# Patient Record
Sex: FEMALE | Race: WHITE | NOT HISPANIC OR LATINO | Employment: FULL TIME | URBAN - METROPOLITAN AREA
[De-identification: names, ages, dates, MRNs, and addresses within clinical notes are randomized per-mention and may not be internally consistent; named-entity substitution may affect disease eponyms.]

---

## 2022-02-17 LAB
C TRACH RRNA SPEC QL PROBE: NEGATIVE
HBV SURFACE AG SERPL QL IA: NEGATIVE
HIV 1+2 AB+HIV1 P24 AG SERPL QL IA: NEGATIVE
N GONORRHOEAE, AMPLIFIED DNA: NEGATIVE
RPR: NONREACTIVE
RUBELLA IMMUNE STATUS: NORMAL

## 2022-05-02 ENCOUNTER — HOSPITAL ENCOUNTER (EMERGENCY)
Facility: HOSPITAL | Age: 32
Discharge: HOME | End: 2022-05-02
Payer: COMMERCIAL

## 2022-05-02 VITALS
WEIGHT: 126 LBS | HEART RATE: 114 BPM | RESPIRATION RATE: 20 BRPM | SYSTOLIC BLOOD PRESSURE: 124 MMHG | OXYGEN SATURATION: 99 % | BODY MASS INDEX: 22.32 KG/M2 | DIASTOLIC BLOOD PRESSURE: 78 MMHG | HEIGHT: 63 IN

## 2022-05-02 DIAGNOSIS — N39.0 UTI (URINARY TRACT INFECTION): ICD-10-CM

## 2022-05-02 LAB
APPEARANCE UR: ABNORMAL
BACTERIA #/AREA URNS AUTO: ABNORMAL /HPF
BILIRUB UR QL STRIP.AUTO: NEGATIVE MG/DL
COLOR UR AUTO: YELLOW
GLUCOSE UR QL STRIP.AUTO: NEGATIVE MG/DL
KETONES UR QL STRIP.AUTO: ABNORMAL MG/DL
LEUKOCYTE ESTERASE UR QL STRIP.AUTO: ABNORMAL UNIT/L
NITRITE UR QL STRIP.AUTO: NEGATIVE
PH UR STRIP.AUTO: 6 [PH]
PROT UR QL STRIP.AUTO: ABNORMAL MG/DL
RBC #/AREA URNS AUTO: ABNORMAL /HPF
RBC UR QL AUTO: ABNORMAL UNIT/L
SP GR UR STRIP.AUTO: 1.02 (ref 1–1.03)
SQUAMOUS #/AREA URNS AUTO: ABNORMAL /LPF
UROBILINOGEN UR STRIP-ACNC: 1 MG/DL
WBC #/AREA URNS AUTO: >=100 /HPF

## 2022-05-02 PROCEDURE — 96372 THER/PROPH/DIAG INJ SC/IM: CPT | Performed by: SPECIALIST

## 2022-05-02 PROCEDURE — 99284 EMERGENCY DEPT VISIT MOD MDM: CPT | Mod: 25

## 2022-05-02 PROCEDURE — 96365 THER/PROPH/DIAG IV INF INIT: CPT

## 2022-05-02 PROCEDURE — 63600175 PHARM REV CODE 636 W HCPCS: Performed by: SPECIALIST

## 2022-05-02 PROCEDURE — 81015 MICROSCOPIC EXAM OF URINE: CPT | Performed by: SPECIALIST

## 2022-05-02 PROCEDURE — 81003 URINALYSIS AUTO W/O SCOPE: CPT | Performed by: SPECIALIST

## 2022-05-02 PROCEDURE — 96361 HYDRATE IV INFUSION ADD-ON: CPT

## 2022-05-02 PROCEDURE — 87077 CULTURE AEROBIC IDENTIFY: CPT | Performed by: SPECIALIST

## 2022-05-02 RX ORDER — TERBUTALINE SULFATE 1 MG/ML
0.25 INJECTION SUBCUTANEOUS ONCE
Status: COMPLETED | OUTPATIENT
Start: 2022-05-02 | End: 2022-05-02

## 2022-05-02 RX ORDER — CEFTRIAXONE 2 G/50ML
2 INJECTION, SOLUTION INTRAVENOUS
Status: COMPLETED | OUTPATIENT
Start: 2022-05-02 | End: 2022-05-02

## 2022-05-02 RX ADMIN — SODIUM CHLORIDE, POTASSIUM CHLORIDE, SODIUM LACTATE AND CALCIUM CHLORIDE 1000 ML: 600; 310; 30; 20 INJECTION, SOLUTION INTRAVENOUS at 12:05

## 2022-05-02 RX ADMIN — TERBUTALINE SULFATE 0.25 MG: 1 INJECTION SUBCUTANEOUS at 02:05

## 2022-05-02 RX ADMIN — CEFTRIAXONE 2 G: 2 INJECTION, SOLUTION INTRAVENOUS at 01:05

## 2022-05-02 NOTE — ED NOTES
Dr pham notified pt here c/o rt lower back pain . Maternal tachycardia . Bp wnl. G1 29 wks 5 days. Md states order UA .

## 2022-05-02 NOTE — ED PROVIDER NOTES
"       TRAN NOTE     Admit Date: 2022  TRAN Physician: Derrell Ag  Primary OBGYN: Dr. Marcin Jaimes    Admit Diagnosis/Chief Complaint: Back Pain  Discharge Diagnosis: Pregnancy 29 weeks.  Urinary tract infection possible early pyelonephritis. and Lower back pain of pregnancy.    Chief Complaint   Patient presents with    Back Pain     Right sides lower started last night     Hospital Course:  Melissa Lopez is a 31 y.o.  at 29w5d presents complaining of right-sided lower back and flank pain which initiated approximately 12:30 a.m..  Patient denies any fever or dysuria.  She reports the pain is variable in nature.  This IUP is complicated by no problems reported by the patient.  All.    Patient denies contractions,, leakage of fluid,, fever,, nausea/vomiting, and dysuria  Fetal Movement: normal.    /78 (BP Location: Left arm, Patient Position: Lying)   Pulse (!) 114   Resp 20   Ht 5' 3" (1.6 m)   Wt 57.2 kg (126 lb)   SpO2 99%   Breastfeeding Yes   BMI 22.32 kg/m²   Pulse:  [114] 114  Resp:  [20] 20  SpO2:  [99 %] 99 %  BP: (124)/(78) 124/78    General: in no apparent distress  Cardiovascular: regular rate and rhythm no murmurs  Respiratory: clear to auscultation, no wheezes, rales or rhonchi, symmetric air entry  Abdominal: fundus soft, nontender 30 weeks size FHT present  Back: lumbar tenderness present CVA tenderness none  Extremeties no redness or tenderness in the calves or thighs no edema    SSE:   SVE:  Cervix is closed, thick, posterior.  No evidence of ruptured membranes.    FHT:  Fetal heart tones are present.  TOCO:  No contractions are noted.    Medical Decision Making:  Patient will be continued observation.  Urinalysis is ordered.    LABS:     Recent Results (from the past 24 hour(s))   Urinalysis    Collection Time: 22 10:45 AM   Result Value Ref Range    Color, UA Yellow Yellow, Colorless, Other, Clear    Appearance, UA Cloudy (A) Clear    Specific Gravity, UA 1.025 1.001 " - 1.030    pH, UA 6.0 5.0, 5.5, 6.0, 6.5, 7.0, 7.5, 8.0, 8.5    Protein, UA 1+ (A) Negative mg/dL    Glucose, UA Negative Negative, Normal mg/dL    Ketones, UA 1+ (A) Negative, +1, +2, +3, +4, +5 mg/dL    Blood, UA Trace-Intact (A) Negative unit/L    Bilirubin, UA Negative Negative mg/dL    Urobilinogen, UA 1.0 0.2, 1.0 mg/dL    Nitrites, UA Negative Negative    Leukocyte Esterase, UA 2+ (A) Negative, 75  unit/L   Urinalysis, Microscopic    Collection Time: 22 10:45 AM   Result Value Ref Range    RBC, UA None Seen None Seen, 0-2, 3-5, 0-5 /HPF    WBC, UA >=100 (A) None Seen, 0-2, 3-5, 0-5 /HPF    Squamous Epithelial Cells, UA 5-10 (A) 0-4, None Seen /LPF    Bacteria, UA Many (A) None Seen, Rare, Occasional /HPF     [unfilled]     Imaging Results    None          ASSESMENT: Melissa Lopez is a 31 y.o.   at 29w5d with lower back pain and evidence of urinary tract infection.  Possible pyelonephritis.  Patient will be treated with Rocephin 2 g IV and referred to her private OB for follow-up.  Chief Complaint   Patient presents with    Back Pain     Right sides lower started last night   Patient began having some regular uterine contractions.  She reports them as being very mild.  She will be treated with Brethine and be evaluated for its effect.  Uterine contractions resolved with treatment of Brethine.  Patient will be discharged home with follow-up this week with her primary OB.  Discharge Diagnosis:  Pregnancy 29 weeks.  Urinary tract infection.  Early pyelonephritis.  Uterine inertia.    Status:Stable    Disposition:  discharged to home    Medications:   Patient to contact her primary Ob if continue with outpatient antibiotics are required.    Patient Instructions:   Current Discharge Medication List      CONTINUE these medications which have NOT CHANGED    Details   prenatal vit/iron fum/folic ac (PRENATAL 1+1 ORAL) Take by mouth.             - Pt was given routine pregnancy instructions including to  return to triage if she had any vaginal bleeding (other than spotting for the next 48hrs), any loss of fluid like her water broke, decreased fetal movement, or contractions Q 5min lasting for 2 or more hours.  Patient is to notify her primary Ob of elevated temperature greater than 100° F. she is to return to the OB Emergency Department if she has any further complications.  Pt was also instructed to drink copious water. Patient voiced understanding of all these instructions and was subsequently discharged home.    She will follow up with Network[facility] reference to record PCT     No discharge procedures on file.    Derrell Ag MD  OB-GYN Hospitalist       Derrell Ag MD  05/02/22 4026

## 2022-05-04 LAB — BACTERIA UR CULT: ABNORMAL

## 2022-05-31 ENCOUNTER — HOSPITAL ENCOUNTER (INPATIENT)
Facility: HOSPITAL | Age: 32
LOS: 4 days | Discharge: HOME | End: 2022-06-04
Attending: SPECIALIST | Admitting: OBSTETRICS & GYNECOLOGY
Payer: COMMERCIAL

## 2022-05-31 DIAGNOSIS — N39.0 UTI (URINARY TRACT INFECTION): Primary | ICD-10-CM

## 2022-05-31 DIAGNOSIS — Z3A.34 34 WEEKS GESTATION OF PREGNANCY: ICD-10-CM

## 2022-05-31 DIAGNOSIS — O41.00X0 OLIGOHYDRAMNIOS: ICD-10-CM

## 2022-05-31 PROBLEM — Z3A.33 33 WEEKS GESTATION OF PREGNANCY: Status: ACTIVE | Noted: 2022-05-31

## 2022-05-31 LAB
ALBUMIN SERPL-MCNC: 2.9 GM/DL (ref 3.5–5)
ALBUMIN/GLOB SERPL: 0.9 RATIO (ref 1.1–2)
ALP SERPL-CCNC: 174 UNIT/L (ref 40–150)
ALT SERPL-CCNC: 8 UNIT/L (ref 0–55)
APPEARANCE UR: ABNORMAL
AST SERPL-CCNC: 14 UNIT/L (ref 5–34)
BACTERIA #/AREA URNS AUTO: ABNORMAL /HPF
BASOPHILS # BLD AUTO: 0.03 X10(3)/MCL (ref 0–0.2)
BASOPHILS NFR BLD AUTO: 0.2 %
BILIRUB UR QL STRIP.AUTO: NEGATIVE MG/DL
BILIRUBIN DIRECT+TOT PNL SERPL-MCNC: 1 MG/DL
BUN SERPL-MCNC: 6.7 MG/DL (ref 7–18.7)
CALCIUM SERPL-MCNC: 8.5 MG/DL (ref 8.4–10.2)
CHLORIDE SERPL-SCNC: 104 MMOL/L (ref 98–107)
CO2 SERPL-SCNC: 18 MMOL/L (ref 22–29)
COLOR UR AUTO: YELLOW
CREAT SERPL-MCNC: 0.7 MG/DL (ref 0.55–1.02)
EOSINOPHIL # BLD AUTO: 0 X10(3)/MCL (ref 0–0.9)
EOSINOPHIL NFR BLD AUTO: 0 %
ERYTHROCYTE [DISTWIDTH] IN BLOOD BY AUTOMATED COUNT: 12.5 % (ref 11.5–17)
GLOBULIN SER-MCNC: 3.1 GM/DL (ref 2.4–3.5)
GLUCOSE SERPL-MCNC: 81 MG/DL (ref 74–100)
GLUCOSE UR QL STRIP.AUTO: NEGATIVE MG/DL
HCT VFR BLD AUTO: 33.5 % (ref 37–47)
HGB BLD-MCNC: 11.1 GM/DL (ref 12–16)
IMM GRANULOCYTES # BLD AUTO: 0.2 X10(3)/MCL (ref 0–0.02)
IMM GRANULOCYTES NFR BLD AUTO: 1.5 % (ref 0–0.43)
KETONES UR QL STRIP.AUTO: ABNORMAL MG/DL
LEUKOCYTE ESTERASE UR QL STRIP.AUTO: ABNORMAL UNIT/L
LYMPHOCYTES # BLD AUTO: 0.64 X10(3)/MCL (ref 0.6–4.6)
LYMPHOCYTES NFR BLD AUTO: 4.8 %
MCH RBC QN AUTO: 29.1 PG (ref 27–31)
MCHC RBC AUTO-ENTMCNC: 33.1 MG/DL (ref 33–36)
MCV RBC AUTO: 87.7 FL (ref 80–94)
MONOCYTES # BLD AUTO: 0.39 X10(3)/MCL (ref 0.1–1.3)
MONOCYTES NFR BLD AUTO: 2.9 %
NEUTROPHILS # BLD AUTO: 12.2 X10(3)/MCL (ref 2.1–9.2)
NEUTROPHILS NFR BLD AUTO: 90.6 %
NITRITE UR QL STRIP.AUTO: POSITIVE
NRBC BLD AUTO-RTO: 0 %
PH UR STRIP.AUTO: 6 [PH]
PLATELET # BLD AUTO: 182 X10(3)/MCL (ref 130–400)
PMV BLD AUTO: 10.5 FL (ref 9.4–12.4)
POTASSIUM SERPL-SCNC: 3.7 MMOL/L (ref 3.5–5.1)
PRENATAL STREP B CULTURE: NORMAL
PROT SERPL-MCNC: 6 GM/DL (ref 6.4–8.3)
PROT UR QL STRIP.AUTO: ABNORMAL MG/DL
RBC # BLD AUTO: 3.82 X10(6)/MCL (ref 4.2–5.4)
RBC #/AREA URNS AUTO: 12 /HPF
RBC UR QL AUTO: ABNORMAL UNIT/L
SODIUM SERPL-SCNC: 136 MMOL/L (ref 136–145)
SP GR UR STRIP.AUTO: 1.01 (ref 1–1.03)
SQUAMOUS #/AREA URNS AUTO: <4 /LPF
UROBILINOGEN UR STRIP-ACNC: 1 MG/DL
WBC # SPEC AUTO: 13.5 X10(3)/MCL (ref 4.5–11.5)
WBC #/AREA URNS AUTO: >100 /HPF

## 2022-05-31 PROCEDURE — 99285 EMERGENCY DEPT VISIT HI MDM: CPT | Mod: 25

## 2022-05-31 PROCEDURE — 63600175 PHARM REV CODE 636 W HCPCS: Performed by: OBSTETRICS & GYNECOLOGY

## 2022-05-31 PROCEDURE — 25000003 PHARM REV CODE 250: Performed by: OBSTETRICS & GYNECOLOGY

## 2022-05-31 PROCEDURE — 81001 URINALYSIS AUTO W/SCOPE: CPT | Performed by: OBSTETRICS & GYNECOLOGY

## 2022-05-31 PROCEDURE — 96360 HYDRATION IV INFUSION INIT: CPT

## 2022-05-31 PROCEDURE — 96372 THER/PROPH/DIAG INJ SC/IM: CPT | Performed by: OBSTETRICS & GYNECOLOGY

## 2022-05-31 PROCEDURE — 94761 N-INVAS EAR/PLS OXIMETRY MLT: CPT

## 2022-05-31 PROCEDURE — 11000001 HC ACUTE MED/SURG PRIVATE ROOM

## 2022-05-31 PROCEDURE — 72100002 HC LABOR CARE, 1ST 8 HOURS

## 2022-05-31 PROCEDURE — 85025 COMPLETE CBC W/AUTO DIFF WBC: CPT | Performed by: OBSTETRICS & GYNECOLOGY

## 2022-05-31 PROCEDURE — 36415 COLL VENOUS BLD VENIPUNCTURE: CPT | Performed by: OBSTETRICS & GYNECOLOGY

## 2022-05-31 PROCEDURE — 86780 TREPONEMA PALLIDUM: CPT | Performed by: OBSTETRICS & GYNECOLOGY

## 2022-05-31 PROCEDURE — 80053 COMPREHEN METABOLIC PANEL: CPT | Performed by: OBSTETRICS & GYNECOLOGY

## 2022-05-31 PROCEDURE — 87653 STREP B DNA AMP PROBE: CPT | Performed by: OBSTETRICS & GYNECOLOGY

## 2022-05-31 RX ORDER — CIPROFLOXACIN 500 MG/1
500 TABLET ORAL 2 TIMES DAILY
Status: DISCONTINUED | OUTPATIENT
Start: 2022-05-31 | End: 2022-06-04 | Stop reason: HOSPADM

## 2022-05-31 RX ORDER — BETAMETHASONE SODIUM PHOSPHATE AND BETAMETHASONE ACETATE 3; 3 MG/ML; MG/ML
12 INJECTION, SUSPENSION INTRA-ARTICULAR; INTRALESIONAL; INTRAMUSCULAR; SOFT TISSUE ONCE
Status: DISCONTINUED | OUTPATIENT
Start: 2022-06-01 | End: 2022-06-01

## 2022-05-31 RX ORDER — SODIUM CHLORIDE, SODIUM LACTATE, POTASSIUM CHLORIDE, CALCIUM CHLORIDE 600; 310; 30; 20 MG/100ML; MG/100ML; MG/100ML; MG/100ML
INJECTION, SOLUTION INTRAVENOUS CONTINUOUS
Status: DISCONTINUED | OUTPATIENT
Start: 2022-05-31 | End: 2022-06-01

## 2022-05-31 RX ORDER — BETAMETHASONE SODIUM PHOSPHATE AND BETAMETHASONE ACETATE 3; 3 MG/ML; MG/ML
12 INJECTION, SUSPENSION INTRA-ARTICULAR; INTRALESIONAL; INTRAMUSCULAR; SOFT TISSUE ONCE
Status: COMPLETED | OUTPATIENT
Start: 2022-05-31 | End: 2022-05-31

## 2022-05-31 RX ORDER — NALBUPHINE HYDROCHLORIDE 10 MG/ML
10 INJECTION, SOLUTION INTRAMUSCULAR; INTRAVENOUS; SUBCUTANEOUS
Status: DISCONTINUED | OUTPATIENT
Start: 2022-05-31 | End: 2022-06-04 | Stop reason: HOSPADM

## 2022-05-31 RX ADMIN — SODIUM CHLORIDE, POTASSIUM CHLORIDE, SODIUM LACTATE AND CALCIUM CHLORIDE: 600; 310; 30; 20 INJECTION, SOLUTION INTRAVENOUS at 07:05

## 2022-05-31 RX ADMIN — CIPROFLOXACIN 500 MG: 500 TABLET, FILM COATED ORAL at 09:05

## 2022-05-31 RX ADMIN — BETAMETHASONE ACETATE AND BETAMETHASONE SODIUM PHOSPHATE 12 MG: 3; 3 INJECTION, SUSPENSION INTRA-ARTICULAR; INTRALESIONAL; INTRAMUSCULAR; SOFT TISSUE at 12:05

## 2022-05-31 RX ADMIN — SODIUM CHLORIDE, POTASSIUM CHLORIDE, SODIUM LACTATE AND CALCIUM CHLORIDE: 600; 310; 30; 20 INJECTION, SOLUTION INTRAVENOUS at 09:05

## 2022-05-31 RX ADMIN — SODIUM CHLORIDE, POTASSIUM CHLORIDE, SODIUM LACTATE AND CALCIUM CHLORIDE: 600; 310; 30; 20 INJECTION, SOLUTION INTRAVENOUS at 11:05

## 2022-05-31 RX ADMIN — CIPROFLOXACIN 500 MG: 500 TABLET, FILM COATED ORAL at 08:05

## 2022-05-31 RX ADMIN — SODIUM CHLORIDE, POTASSIUM CHLORIDE, SODIUM LACTATE AND CALCIUM CHLORIDE: 600; 310; 30; 20 INJECTION, SOLUTION INTRAVENOUS at 08:05

## 2022-05-31 RX ADMIN — SODIUM CHLORIDE, POTASSIUM CHLORIDE, SODIUM LACTATE AND CALCIUM CHLORIDE 1000 ML: 600; 310; 30; 20 INJECTION, SOLUTION INTRAVENOUS at 06:05

## 2022-05-31 RX ADMIN — NALBUPHINE HYDROCHLORIDE 10 MG: 10 INJECTION, SOLUTION INTRAMUSCULAR; INTRAVENOUS; SUBCUTANEOUS at 09:05

## 2022-05-31 RX ADMIN — SODIUM CHLORIDE, POTASSIUM CHLORIDE, SODIUM LACTATE AND CALCIUM CHLORIDE: 600; 310; 30; 20 INJECTION, SOLUTION INTRAVENOUS at 04:05

## 2022-05-31 NOTE — H&P
[facility] - Labor and Delivery  Obstetrics  History & Physical    Patient Name: Melissa Lopez  MRN: [id number]  Admission Date: 2022  Primary Care Provider: No primary care provider on file.    Subjective:     Principal Problem:<principal problem not specified>    History of Present Illness: 33 weeks with UTI,PTL,and oligohydramnios    Obstetric HPI:      This pregnancy has been complicated by     OB History    Para Term  AB Living   1 0 0 0 0 0   SAB IAB Ectopic Multiple Live Births   0 0 0 0 0      # Outcome Date GA Lbr Spenser/2nd Weight Sex Delivery Anes PTL Lv   1 Current              Past Medical History:   Diagnosis Date    Thyroid disease      Past Surgical History:   Procedure Laterality Date    TONSILLECTOMY         PTA Medications   Medication Sig    prenatal vit/iron fum/folic ac (PRENATAL 1+1 ORAL) Take by mouth.       Review of patient's allergies indicates:   Allergen Reactions    Sulfa (sulfonamide antibiotics) Anaphylaxis        Family History     Problem Relation (Age of Onset)    Breast cancer Mother        Tobacco Use    Smoking status: Never Smoker    Smokeless tobacco: Never Used   Substance and Sexual Activity    Alcohol use: Not Currently    Drug use: Not Currently    Sexual activity: Not on file     Review of Systems   Objective:     Vital Signs (Most Recent):  Temp: 97.9 °F (36.6 °C) (22 1245)  Pulse: 104 (22 0759)  Resp: 17 (22 1240)  BP: 122/76 (22 1240) Vital Signs (24h Range):  Temp:  [97.9 °F (36.6 °C)-98.2 °F (36.8 °C)] 97.9 °F (36.6 °C)  Pulse:  [101-116] 104  Resp:  [17-18] 17  BP: (107-122)/(66-82) 122/76        There is no height or weight on file to calculate BMI.          Physical Exam    Cervix:  Dilation:  0  Effacement:  25%         Significant Labs:          Assessment/Plan:     31 y.o. female  at 33w6d for: PTL, UTI    There are no hospital problems to display for this patient.    As above    Marcin Jaimes,  MD  Obstetrics  [facility] - Labor and Delivery

## 2022-05-31 NOTE — Clinical Note
Transfer To (Destination): [facility] ANTEPARTUM [[id number]]   Diagnosis: UTI (urinary tract infection) [949449]   Future Attending Provider: LUIS ALFREDO ATKINS [[id number]]   Requested Bed Type: Standard [1]   Admitting Provider: LUIS ALFREDO ATKINS [[id number]]

## 2022-06-01 ENCOUNTER — ANESTHESIA EVENT (OUTPATIENT)
Dept: OBSTETRICS AND GYNECOLOGY | Facility: HOSPITAL | Age: 32
End: 2022-06-01
Payer: COMMERCIAL

## 2022-06-01 LAB
ABORH RETYPE: NORMAL
GROUP & RH: NORMAL
INDIRECT COOMBS GEL: NORMAL
STREP B PCR (OHS): NOT DETECTED
T PALLIDUM AB SER QL: NONREACTIVE
T PALLIDUM AB SER QL: NORMAL

## 2022-06-01 PROCEDURE — 63600175 PHARM REV CODE 636 W HCPCS: Performed by: OBSTETRICS & GYNECOLOGY

## 2022-06-01 PROCEDURE — 86850 RBC ANTIBODY SCREEN: CPT | Performed by: OBSTETRICS & GYNECOLOGY

## 2022-06-01 PROCEDURE — 25000003 PHARM REV CODE 250: Performed by: OBSTETRICS & GYNECOLOGY

## 2022-06-01 PROCEDURE — 11000001 HC ACUTE MED/SURG PRIVATE ROOM

## 2022-06-01 PROCEDURE — 36415 COLL VENOUS BLD VENIPUNCTURE: CPT | Performed by: OBSTETRICS & GYNECOLOGY

## 2022-06-01 PROCEDURE — 72100003 HC LABOR CARE, EA. ADDL. 8 HRS

## 2022-06-01 RX ORDER — SODIUM CHLORIDE, SODIUM LACTATE, POTASSIUM CHLORIDE, CALCIUM CHLORIDE 600; 310; 30; 20 MG/100ML; MG/100ML; MG/100ML; MG/100ML
INJECTION, SOLUTION INTRAVENOUS CONTINUOUS
Status: DISCONTINUED | OUTPATIENT
Start: 2022-06-01 | End: 2022-06-02

## 2022-06-01 RX ORDER — SODIUM CITRATE AND CITRIC ACID MONOHYDRATE 334; 500 MG/5ML; MG/5ML
SOLUTION ORAL
Status: DISPENSED
Start: 2022-06-01 | End: 2022-06-02

## 2022-06-01 RX ORDER — SODIUM CITRATE AND CITRIC ACID MONOHYDRATE 334; 500 MG/5ML; MG/5ML
30 SOLUTION ORAL
Status: DISCONTINUED | OUTPATIENT
Start: 2022-06-01 | End: 2022-06-02

## 2022-06-01 RX ORDER — CARBOPROST TROMETHAMINE 250 UG/ML
250 INJECTION, SOLUTION INTRAMUSCULAR
Status: DISCONTINUED | OUTPATIENT
Start: 2022-06-01 | End: 2022-06-04 | Stop reason: HOSPADM

## 2022-06-01 RX ORDER — MISOPROSTOL 100 UG/1
800 TABLET ORAL
Status: DISCONTINUED | OUTPATIENT
Start: 2022-06-01 | End: 2022-06-02

## 2022-06-01 RX ORDER — METHYLERGONOVINE MALEATE 0.2 MG/ML
200 INJECTION INTRAVENOUS
Status: DISCONTINUED | OUTPATIENT
Start: 2022-06-01 | End: 2022-06-02

## 2022-06-01 RX ORDER — OXYTOCIN/RINGER'S LACTATE 30/500 ML
334 PLASTIC BAG, INJECTION (ML) INTRAVENOUS ONCE
Status: DISCONTINUED | OUTPATIENT
Start: 2022-06-01 | End: 2022-06-02

## 2022-06-01 RX ORDER — FAMOTIDINE 10 MG/ML
20 INJECTION INTRAVENOUS
Status: DISCONTINUED | OUTPATIENT
Start: 2022-06-01 | End: 2022-06-02

## 2022-06-01 RX ORDER — BETAMETHASONE SODIUM PHOSPHATE AND BETAMETHASONE ACETATE 3; 3 MG/ML; MG/ML
12 INJECTION, SUSPENSION INTRA-ARTICULAR; INTRALESIONAL; INTRAMUSCULAR; SOFT TISSUE ONCE
Status: COMPLETED | OUTPATIENT
Start: 2022-06-01 | End: 2022-06-01

## 2022-06-01 RX ORDER — OXYTOCIN/RINGER'S LACTATE 30/500 ML
95 PLASTIC BAG, INJECTION (ML) INTRAVENOUS ONCE
Status: DISCONTINUED | OUTPATIENT
Start: 2022-06-01 | End: 2022-06-02

## 2022-06-01 RX ADMIN — SODIUM CHLORIDE, POTASSIUM CHLORIDE, SODIUM LACTATE AND CALCIUM CHLORIDE: 600; 310; 30; 20 INJECTION, SOLUTION INTRAVENOUS at 06:06

## 2022-06-01 RX ADMIN — SODIUM CHLORIDE, POTASSIUM CHLORIDE, SODIUM LACTATE AND CALCIUM CHLORIDE: 600; 310; 30; 20 INJECTION, SOLUTION INTRAVENOUS at 03:06

## 2022-06-01 RX ADMIN — BETAMETHASONE ACETATE AND BETAMETHASONE SODIUM PHOSPHATE 12 MG: 3; 3 INJECTION, SUSPENSION INTRA-ARTICULAR; INTRALESIONAL; INTRAMUSCULAR; SOFT TISSUE at 08:06

## 2022-06-01 RX ADMIN — CIPROFLOXACIN 500 MG: 500 TABLET, FILM COATED ORAL at 08:06

## 2022-06-01 RX ADMIN — CIPROFLOXACIN 500 MG: 500 TABLET, FILM COATED ORAL at 09:06

## 2022-06-01 NOTE — PROGRESS NOTES
[facility] - Labor and Delivery  Obstetrics  Antepartum Progress Note    Patient Name: Melissa Lopez  MRN: [id number]  Admission Date: 2022  Hospital Length of Stay: 1 days  Attending Physician: Marcin Jaimes MD  Primary Care Provider: No primary care provider on file.    Subjective:     Principal Problem:    HPI: 34 weeks with oligo, and breech presentation  No notes on file    Hospital Course:  Reactive tracing with very spaced out decels  No new subjective & objective note has been filed under this hospital service since the last note was generated.    Assessment/Plan:     31 y.o. female  at 34w0d for:    No notes have been filed under this hospital service.  Service: Obstetrics and Gynecology    Will proceed with section tomorrow unless situation changes  Marcin Jaimes MD  Obstetrics  [facility] - Labor and Delivery

## 2022-06-01 NOTE — CONSULTS
Melissa Lopez is a 31 y.o.  female  at 34w0d  gestation who presented yesterday with contractions.  BPP done and noted to have oligo at 3.5cm. Initial SVE showed 1cm dilation.. She received Celestone x 1 on 22. She had UA done, and is being treated for UTI with Cipro as UA showed 3+ protein, 2+ blood, positive nitrites and 3+ leukocyte esterase. Noted on 22 had positive urine culture for E Coli and she reports treated with Rocephin IV x 1 dose. Reports occasional contractions. Denies vaginal bleeding, leaking of fluid, or decreased fetal movement. Denies headaches, visual disturbances, epigastric pain.      Review of Systems   Constitutional: Negative for fever.   HENT: Negative for ear pain, facial swelling and sore throat.    Eyes: Negative for visual disturbance.   Cardiovascular: Negative.    Gastrointestinal: Negative for abdominal pain, nausea and vomiting.   Endocrine: Negative.    Genitourinary: Negative for flank pain, vaginal bleeding and vaginal discharge.   Neurological: Negative for headaches.   Psychiatric/Behavioral: Negative.      [unfilled]  the patient    Past Medical History:   Diagnosis Date    Thyroid disease       Past Surgical History:   Procedure Laterality Date    TONSILLECTOMY        Social History     Socioeconomic History    Marital status:    Tobacco Use    Smoking status: Never Smoker    Smokeless tobacco: Never Used   Substance and Sexual Activity    Alcohol use: Not Currently    Drug use: Not Currently      Family History   Problem Relation Age of Onset    Breast cancer Mother       Review of patient's allergies indicates:   Allergen Reactions    Sulfa (sulfonamide antibiotics) Anaphylaxis      Prior to Admission medications    Medication Sig Start Date End Date Taking? Authorizing Provider   prenatal vit/iron fum/folic ac (PRENATAL 1+1 ORAL) Take by mouth.    Historical Provider            Vitals:    22 1919 22 2133 22 0100 22  0501   BP: 109/67 103/71 113/61 107/60   Pulse: 100 92 94 108   Resp: 18 18 18 18   Temp:    98.1 °F (36.7 °C)   TempSrc:    Oral    No LMP recorded. Patient is pregnant.   HT:    WT:    BMI:       Temp:  [97.9 °F (36.6 °C)-98.1 °F (36.7 °C)] 98.1 °F (36.7 °C)  Pulse:  [] 108  Resp:  [17-18] 18  BP: (103-122)/(60-76) 107/60  Physical Exam  Constitutional:       Appearance: Normal appearance.   Cardiovascular:      Rate and Rhythm: Normal rate.      Heart sounds: Normal heart sounds.   Pulmonary:      Effort: Pulmonary effort is normal.      Breath sounds: Normal breath sounds.   Abdominal:      Palpations: Abdomen is soft.   Musculoskeletal:         General: Normal range of motion.   Skin:     General: Skin is warm and dry.   Neurological:      Mental Status: She is alert and oriented to person, place, and time.   Psychiatric:         Mood and Affect: Mood normal.       Recent Results (from the past 48 hour(s))   Strep B Screen, Vaginal / Rectal    Collection Time: 05/31/22 12:00 AM    Specimen: Vaginal/Rectal   Result Value Ref Range    Strep B Culture neg    Comprehensive metabolic panel    Collection Time: 05/31/22  6:43 AM   Result Value Ref Range    Sodium Level 136 136 - 145 mmol/L    Potassium Level 3.7 3.5 - 5.1 mmol/L    Chloride 104 98 - 107 mmol/L    Carbon Dioxide 18 (L) 22 - 29 mmol/L    Glucose Level 81 74 - 100 mg/dL    Blood Urea Nitrogen 6.7 (L) 7.0 - 18.7 mg/dL    Creatinine 0.70 0.55 - 1.02 mg/dL    Calcium Level Total 8.5 8.4 - 10.2 mg/dL    Protein Total 6.0 (L) 6.4 - 8.3 gm/dL    Albumin Level 2.9 (L) 3.5 - 5.0 gm/dL    Globulin 3.1 2.4 - 3.5 gm/dL    Albumin/Globulin Ratio 0.9 (L) 1.1 - 2.0 ratio    Bilirubin Total 1.0 <=1.5 mg/dL    Alkaline Phosphatase 174 (H) 40 - 150 unit/L    Alanine Aminotransferase 8 0 - 55 unit/L    Aspartate Aminotransferase 14 5 - 34 unit/L    Estimated GFR-Non  >60 mls/min/1.73/m2   CBC with Differential    Collection Time: 05/31/22  6:43 AM    Result Value Ref Range    WBC 13.5 (H) 4.5 - 11.5 x10(3)/mcL    RBC 3.82 (L) 4.20 - 5.40 x10(6)/mcL    Hgb 11.1 (L) 12.0 - 16.0 gm/dL    Hct 33.5 (L) 37.0 - 47.0 %    MCV 87.7 80.0 - 94.0 fL    MCH 29.1 27.0 - 31.0 pg    MCHC 33.1 33.0 - 36.0 mg/dL    RDW 12.5 11.5 - 17.0 %    Platelet 182 130 - 400 x10(3)/mcL    MPV 10.5 9.4 - 12.4 fL    Neut % 90.6 %    Lymph % 4.8 %    Mono % 2.9 %    Eos % 0.0 %    Basophil % 0.2 %    Lymph # 0.64 0.6 - 4.6 x10(3)/mcL    Neut # 12.2 (H) 2.1 - 9.2 x10(3)/mcL    Mono # 0.39 0.1 - 1.3 x10(3)/mcL    Eos # 0.00 0 - 0.9 x10(3)/mcL    Baso # 0.03 0 - 0.2 x10(3)/mcL    IG# 0.20 (H) 0 - 0.0155 x10(3)/mcL    IG% 1.5 (H) 0 - 0.43 %    NRBC% 0.0 %   Urinalysis, Reflex to Urine Culture Urine, Clean Catch    Collection Time: 05/31/22  7:28 AM    Specimen: Urine   Result Value Ref Range    Color, UA Yellow Yellow, Colorless, Other, Clear    Appearance, UA Turbid (A) Clear    Specific Gravity, UA 1.015 1.001 - 1.030    pH, UA 6.0 5.0, 5.5, 6.0, 6.5, 7.0, 7.5, 8.0, 8.5    Protein, UA 3+ (A) Negative, 300  mg/dL    Glucose, UA Negative Negative, Normal mg/dL    Ketones, UA 4+ (A) Negative, +1, +2, +3, +4, +5, >=160 mg/dL    Blood, UA 2+ (A) Negative unit/L    Bilirubin, UA Negative Negative mg/dL    Urobilinogen, UA 1.0 0.2, 1.0, Normal mg/dL    Nitrites, UA Positive (A) Negative    Leukocyte Esterase, UA 3+ (A) Negative, 75  unit/L   Urinalysis, Microscopic    Collection Time: 05/31/22  7:28 AM   Result Value Ref Range    RBC, UA 12 (H) <=5 /HPF    WBC, UA >100 (H) <=5 /HPF    Squamous Epithelial Cells, UA <4 <=4 /LPF    Bacteria, UA 3+ (A) None Seen, Rare, Occasional /HPF   Urine culture    Collection Time: 05/31/22  7:28 AM    Specimen: Urine   Result Value Ref Range    Urine Culture >/= 100,000 colonies/ml Gram-negative Rods (A)    Strep Group B by PCR    Collection Time: 05/31/22  4:48 PM   Result Value Ref Range    STREP B PCR ([facility]) Not Detected Not Detected      US OB Non Rad Limited 1  Or More Gestations  See Provider Notes for results.     IMPRESSION: Please see provider notes for interpretation    This procedure was auto-finalized by: Virtual Radiologist  US Non Rad Doppler Umbilical Artery  See Provider Notes for results.     IMPRESSION: Please see provider notes for interpretation    This procedure was auto-finalized by: Virtual Radiologist  US OB Non Rad 14+ Weeks TransAbd, w/Biophysical Profile, w/NST (xpd)  See Provider Notes for results.     IMPRESSION: Please see provider notes for interpretation    This procedure was auto-finalized by: Virtual Radiologist            Plan   contractions with oligohydramnios.  BPP/UAD done this am with preliminary read of FIDEL 3.4 and normal UAD BPP 6/8 for fluid.  Fetal growth done and preliminary noted EFW 31% and abdominal circumference at 11%, lagging growth.  Dr Engel to finalize US    Discussed with patient that likely cause of lagging growth is uteroplacental insufficiency especially with presence of oligo and decelerations.    Will give second dose of Celestone now.    Fetal tracing 130s baseline, moderate variability, 15x15 accelerations, intermittent decelerations on prolonged monitoring, including some late decelerations, though late decelerations resolved with position change.    Discussed with patient the increased  morbidity/mortality associated with delivery at this gestational age. With lagging fetal growth, persistent oligo, intermittent decelerations, will plan delivery tomorrow once in steroid window - discussed with Dr Engel, who discussed with Dr. Jaimes.  Earlier delivery needed if deteriorating fetal status or other usual OB indications    -UTI. Preliminary urine culture shows gram negative rods; continue Cipro until final culture but suspect e. Coli as positive culture for such in early May.    -VTE prophylaxis:  Continue current management

## 2022-06-02 ENCOUNTER — ANESTHESIA (OUTPATIENT)
Dept: OBSTETRICS AND GYNECOLOGY | Facility: HOSPITAL | Age: 32
End: 2022-06-02
Payer: COMMERCIAL

## 2022-06-02 PROBLEM — Z3A.34 34 WEEKS GESTATION OF PREGNANCY: Status: ACTIVE | Noted: 2022-06-02

## 2022-06-02 LAB — BACTERIA UR CULT: ABNORMAL

## 2022-06-02 PROCEDURE — 36004725 HC OB OR TIME LEV III - EA ADD 15 MIN: Performed by: OBSTETRICS & GYNECOLOGY

## 2022-06-02 PROCEDURE — 37000009 HC ANESTHESIA EA ADD 15 MINS: Performed by: OBSTETRICS & GYNECOLOGY

## 2022-06-02 PROCEDURE — 63600175 PHARM REV CODE 636 W HCPCS

## 2022-06-02 PROCEDURE — 25000003 PHARM REV CODE 250: Performed by: OBSTETRICS & GYNECOLOGY

## 2022-06-02 PROCEDURE — 37000008 HC ANESTHESIA 1ST 15 MINUTES: Performed by: OBSTETRICS & GYNECOLOGY

## 2022-06-02 PROCEDURE — 51702 INSERT TEMP BLADDER CATH: CPT

## 2022-06-02 PROCEDURE — 94761 N-INVAS EAR/PLS OXIMETRY MLT: CPT

## 2022-06-02 PROCEDURE — 63600175 PHARM REV CODE 636 W HCPCS: Performed by: NURSE ANESTHETIST, CERTIFIED REGISTERED

## 2022-06-02 PROCEDURE — 36004724 HC OB OR TIME LEV III - 1ST 15 MIN: Performed by: OBSTETRICS & GYNECOLOGY

## 2022-06-02 PROCEDURE — 11000001 HC ACUTE MED/SURG PRIVATE ROOM

## 2022-06-02 PROCEDURE — 63600175 PHARM REV CODE 636 W HCPCS: Performed by: OBSTETRICS & GYNECOLOGY

## 2022-06-02 PROCEDURE — 71000033 HC RECOVERY, INTIAL HOUR: Performed by: OBSTETRICS & GYNECOLOGY

## 2022-06-02 PROCEDURE — 25000003 PHARM REV CODE 250: Performed by: ANESTHESIOLOGY

## 2022-06-02 PROCEDURE — 27201423 OPTIME MED/SURG SUP & DEVICES STERILE SUPPLY: Performed by: OBSTETRICS & GYNECOLOGY

## 2022-06-02 RX ORDER — PROCHLORPERAZINE EDISYLATE 5 MG/ML
5 INJECTION INTRAMUSCULAR; INTRAVENOUS EVERY 6 HOURS PRN
Status: DISCONTINUED | OUTPATIENT
Start: 2022-06-02 | End: 2022-06-04 | Stop reason: HOSPADM

## 2022-06-02 RX ORDER — DIPHENHYDRAMINE HCL 25 MG
25 CAPSULE ORAL EVERY 4 HOURS PRN
Status: DISCONTINUED | OUTPATIENT
Start: 2022-06-02 | End: 2022-06-04 | Stop reason: HOSPADM

## 2022-06-02 RX ORDER — SODIUM CITRATE AND CITRIC ACID MONOHYDRATE 334; 500 MG/5ML; MG/5ML
30 SOLUTION ORAL ONCE
Status: DISCONTINUED | OUTPATIENT
Start: 2022-06-02 | End: 2022-06-02

## 2022-06-02 RX ORDER — ONDANSETRON 2 MG/ML
6 INJECTION INTRAMUSCULAR; INTRAVENOUS EVERY 6 HOURS PRN
Status: DISCONTINUED | OUTPATIENT
Start: 2022-06-02 | End: 2022-06-04 | Stop reason: HOSPADM

## 2022-06-02 RX ORDER — DIPHENHYDRAMINE HYDROCHLORIDE 50 MG/ML
25 INJECTION INTRAMUSCULAR; INTRAVENOUS EVERY 4 HOURS PRN
Status: DISCONTINUED | OUTPATIENT
Start: 2022-06-02 | End: 2022-06-04 | Stop reason: HOSPADM

## 2022-06-02 RX ORDER — BUPIVACAINE HYDROCHLORIDE 7.5 MG/ML
INJECTION, SOLUTION EPIDURAL; RETROBULBAR
Status: COMPLETED | OUTPATIENT
Start: 2022-06-02 | End: 2022-06-02

## 2022-06-02 RX ORDER — SODIUM CHLORIDE, SODIUM LACTATE, POTASSIUM CHLORIDE, CALCIUM CHLORIDE 600; 310; 30; 20 MG/100ML; MG/100ML; MG/100ML; MG/100ML
INJECTION, SOLUTION INTRAVENOUS CONTINUOUS
Status: DISCONTINUED | OUTPATIENT
Start: 2022-06-02 | End: 2022-06-02

## 2022-06-02 RX ORDER — OXYCODONE HYDROCHLORIDE 5 MG/1
10 TABLET ORAL EVERY 4 HOURS PRN
Status: ACTIVE | OUTPATIENT
Start: 2022-06-02 | End: 2022-06-03

## 2022-06-02 RX ORDER — OXYTOCIN/RINGER'S LACTATE 30/500 ML
95 PLASTIC BAG, INJECTION (ML) INTRAVENOUS ONCE
Status: COMPLETED | OUTPATIENT
Start: 2022-06-02 | End: 2022-06-02

## 2022-06-02 RX ORDER — OXYTOCIN 10 [USP'U]/ML
INJECTION, SOLUTION INTRAMUSCULAR; INTRAVENOUS
Status: DISCONTINUED | OUTPATIENT
Start: 2022-06-02 | End: 2022-06-02

## 2022-06-02 RX ORDER — IBUPROFEN 600 MG/1
600 TABLET ORAL EVERY 6 HOURS
Status: DISCONTINUED | OUTPATIENT
Start: 2022-06-03 | End: 2022-06-04 | Stop reason: HOSPADM

## 2022-06-02 RX ORDER — MORPHINE SULFATE 0.5 MG/ML
INJECTION, SOLUTION EPIDURAL; INTRATHECAL; INTRAVENOUS
Status: DISCONTINUED | OUTPATIENT
Start: 2022-06-02 | End: 2022-06-02

## 2022-06-02 RX ORDER — MISOPROSTOL 100 UG/1
800 TABLET ORAL
Status: DISCONTINUED | OUTPATIENT
Start: 2022-06-02 | End: 2022-06-04 | Stop reason: HOSPADM

## 2022-06-02 RX ORDER — CEFAZOLIN SODIUM IN 0.9 % NACL 2 G/100 ML
PLASTIC BAG, INJECTION (ML) INTRAVENOUS
Status: DISCONTINUED | OUTPATIENT
Start: 2022-06-02 | End: 2022-06-02

## 2022-06-02 RX ORDER — MUPIROCIN 20 MG/G
OINTMENT TOPICAL
Status: DISCONTINUED | OUTPATIENT
Start: 2022-06-02 | End: 2022-06-02

## 2022-06-02 RX ORDER — ONDANSETRON 2 MG/ML
4 INJECTION INTRAMUSCULAR; INTRAVENOUS EVERY 6 HOURS PRN
Status: ACTIVE | OUTPATIENT
Start: 2022-06-02 | End: 2022-06-03

## 2022-06-02 RX ORDER — DOCUSATE SODIUM 100 MG/1
200 CAPSULE, LIQUID FILLED ORAL 2 TIMES DAILY
Status: DISCONTINUED | OUTPATIENT
Start: 2022-06-02 | End: 2022-06-04 | Stop reason: HOSPADM

## 2022-06-02 RX ORDER — OXYTOCIN/RINGER'S LACTATE 30/500 ML
95 PLASTIC BAG, INJECTION (ML) INTRAVENOUS ONCE
Status: DISCONTINUED | OUTPATIENT
Start: 2022-06-02 | End: 2022-06-02

## 2022-06-02 RX ORDER — DEXTROSE, SODIUM CHLORIDE, SODIUM LACTATE, POTASSIUM CHLORIDE, AND CALCIUM CHLORIDE 5; .6; .31; .03; .02 G/100ML; G/100ML; G/100ML; G/100ML; G/100ML
INJECTION, SOLUTION INTRAVENOUS CONTINUOUS
Status: DISCONTINUED | OUTPATIENT
Start: 2022-06-02 | End: 2022-06-04 | Stop reason: HOSPADM

## 2022-06-02 RX ORDER — SODIUM CITRATE AND CITRIC ACID MONOHYDRATE 334; 500 MG/5ML; MG/5ML
30 SOLUTION ORAL
Status: DISCONTINUED | OUTPATIENT
Start: 2022-06-02 | End: 2022-06-02

## 2022-06-02 RX ORDER — OXYCODONE HYDROCHLORIDE 5 MG/1
5 TABLET ORAL EVERY 4 HOURS PRN
Status: ACTIVE | OUTPATIENT
Start: 2022-06-02 | End: 2022-06-03

## 2022-06-02 RX ORDER — ONDANSETRON 4 MG/1
4 TABLET, ORALLY DISINTEGRATING ORAL EVERY 6 HOURS PRN
Status: DISCONTINUED | OUTPATIENT
Start: 2022-06-02 | End: 2022-06-04 | Stop reason: HOSPADM

## 2022-06-02 RX ORDER — KETOROLAC TROMETHAMINE 30 MG/ML
INJECTION, SOLUTION INTRAMUSCULAR; INTRAVENOUS
Status: DISCONTINUED | OUTPATIENT
Start: 2022-06-02 | End: 2022-06-02

## 2022-06-02 RX ORDER — CARBOPROST TROMETHAMINE 250 UG/ML
250 INJECTION, SOLUTION INTRAMUSCULAR
Status: DISCONTINUED | OUTPATIENT
Start: 2022-06-02 | End: 2022-06-02

## 2022-06-02 RX ORDER — KETOROLAC TROMETHAMINE 30 MG/ML
30 INJECTION, SOLUTION INTRAMUSCULAR; INTRAVENOUS EVERY 6 HOURS
Status: DISPENSED | OUTPATIENT
Start: 2022-06-02 | End: 2022-06-03

## 2022-06-02 RX ORDER — ADHESIVE BANDAGE
30 BANDAGE TOPICAL 2 TIMES DAILY PRN
Status: DISCONTINUED | OUTPATIENT
Start: 2022-06-03 | End: 2022-06-04 | Stop reason: HOSPADM

## 2022-06-02 RX ORDER — OXYCODONE AND ACETAMINOPHEN 5; 325 MG/1; MG/1
1 TABLET ORAL EVERY 6 HOURS PRN
Status: DISCONTINUED | OUTPATIENT
Start: 2022-06-02 | End: 2022-06-04 | Stop reason: HOSPADM

## 2022-06-02 RX ORDER — MORPHINE SULFATE 4 MG/ML
2 INJECTION, SOLUTION INTRAMUSCULAR; INTRAVENOUS EVERY 5 MIN PRN
Status: DISCONTINUED | OUTPATIENT
Start: 2022-06-03 | End: 2022-06-04 | Stop reason: HOSPADM

## 2022-06-02 RX ORDER — PHENYLEPHRINE HYDROCHLORIDE 10 MG/ML
INJECTION INTRAVENOUS
Status: DISCONTINUED | OUTPATIENT
Start: 2022-06-02 | End: 2022-06-02

## 2022-06-02 RX ORDER — FENTANYL CITRATE 50 UG/ML
INJECTION, SOLUTION INTRAMUSCULAR; INTRAVENOUS
Status: DISCONTINUED | OUTPATIENT
Start: 2022-06-02 | End: 2022-06-02

## 2022-06-02 RX ORDER — ONDANSETRON 2 MG/ML
INJECTION INTRAMUSCULAR; INTRAVENOUS
Status: DISCONTINUED | OUTPATIENT
Start: 2022-06-02 | End: 2022-06-02

## 2022-06-02 RX ORDER — SIMETHICONE 80 MG
1 TABLET,CHEWABLE ORAL EVERY 4 HOURS PRN
Status: DISCONTINUED | OUTPATIENT
Start: 2022-06-02 | End: 2022-06-04 | Stop reason: HOSPADM

## 2022-06-02 RX ORDER — ACETAMINOPHEN 10 MG/ML
INJECTION, SOLUTION INTRAVENOUS
Status: DISCONTINUED | OUTPATIENT
Start: 2022-06-02 | End: 2022-06-02

## 2022-06-02 RX ORDER — BISACODYL 10 MG
10 SUPPOSITORY, RECTAL RECTAL ONCE AS NEEDED
Status: DISCONTINUED | OUTPATIENT
Start: 2022-06-02 | End: 2022-06-04 | Stop reason: HOSPADM

## 2022-06-02 RX ORDER — MORPHINE SULFATE 4 MG/ML
INJECTION, SOLUTION INTRAMUSCULAR; INTRAVENOUS
Status: DISPENSED
Start: 2022-06-02 | End: 2022-06-02

## 2022-06-02 RX ORDER — ACETAMINOPHEN 325 MG/1
650 TABLET ORAL EVERY 4 HOURS PRN
Status: DISCONTINUED | OUTPATIENT
Start: 2022-06-02 | End: 2022-06-04 | Stop reason: HOSPADM

## 2022-06-02 RX ORDER — OXYCODONE AND ACETAMINOPHEN 10; 325 MG/1; MG/1
1 TABLET ORAL EVERY 6 HOURS PRN
Status: DISCONTINUED | OUTPATIENT
Start: 2022-06-02 | End: 2022-06-04 | Stop reason: HOSPADM

## 2022-06-02 RX ORDER — ACETAMINOPHEN 325 MG/1
650 TABLET ORAL EVERY 6 HOURS
Status: DISPENSED | OUTPATIENT
Start: 2022-06-02 | End: 2022-06-03

## 2022-06-02 RX ORDER — BUPIVACAINE HYDROCHLORIDE 7.5 MG/ML
INJECTION, SOLUTION EPIDURAL; RETROBULBAR
Status: DISCONTINUED | OUTPATIENT
Start: 2022-06-02 | End: 2022-06-02

## 2022-06-02 RX ORDER — MISOPROSTOL 100 UG/1
400 TABLET ORAL ONCE AS NEEDED
Status: DISCONTINUED | OUTPATIENT
Start: 2022-06-02 | End: 2022-06-04 | Stop reason: HOSPADM

## 2022-06-02 RX ORDER — OXYTOCIN/RINGER'S LACTATE 30/500 ML
334 PLASTIC BAG, INJECTION (ML) INTRAVENOUS ONCE
Status: DISCONTINUED | OUTPATIENT
Start: 2022-06-02 | End: 2022-06-02

## 2022-06-02 RX ORDER — METHYLERGONOVINE MALEATE 0.2 MG/ML
200 INJECTION INTRAVENOUS
Status: DISCONTINUED | OUTPATIENT
Start: 2022-06-02 | End: 2022-06-04 | Stop reason: HOSPADM

## 2022-06-02 RX ADMIN — KETOROLAC TROMETHAMINE 30 MG: 30 INJECTION, SOLUTION INTRAMUSCULAR at 07:06

## 2022-06-02 RX ADMIN — FENTANYL CITRATE 10 MCG: 50 INJECTION, SOLUTION INTRAMUSCULAR; INTRAVENOUS at 06:06

## 2022-06-02 RX ADMIN — Medication 2 G: at 06:06

## 2022-06-02 RX ADMIN — Medication 95 MILLI-UNITS/MIN: at 07:06

## 2022-06-02 RX ADMIN — MORPHINE SULFATE 0.1 MG: 0.5 INJECTION, SOLUTION EPIDURAL; INTRATHECAL; INTRAVENOUS at 06:06

## 2022-06-02 RX ADMIN — ONDANSETRON 4 MG: 2 INJECTION INTRAMUSCULAR; INTRAVENOUS at 06:06

## 2022-06-02 RX ADMIN — BUPIVACAINE HYDROCHLORIDE 1.5 ML: 7.5 INJECTION, SOLUTION EPIDURAL; RETROBULBAR at 06:06

## 2022-06-02 RX ADMIN — OXYTOCIN 30 UNITS: 10 INJECTION, SOLUTION INTRAMUSCULAR; INTRAVENOUS at 06:06

## 2022-06-02 RX ADMIN — ACETAMINOPHEN 1000 MG: 10 INJECTION, SOLUTION INTRAVENOUS at 07:06

## 2022-06-02 RX ADMIN — SODIUM CITRATE AND CITRIC ACID MONOHYDRATE 30 ML: 500; 334 SOLUTION ORAL at 05:06

## 2022-06-02 RX ADMIN — PHENYLEPHRINE HYDROCHLORIDE 100 MCG: 10 INJECTION INTRAVENOUS at 06:06

## 2022-06-02 RX ADMIN — KETOROLAC TROMETHAMINE 30 MG: 30 INJECTION, SOLUTION INTRAMUSCULAR at 08:06

## 2022-06-02 RX ADMIN — CIPROFLOXACIN 500 MG: 500 TABLET, FILM COATED ORAL at 10:06

## 2022-06-02 NOTE — CONSULTS
[facility]                       [address] [address]    PATIENT NAME:       CHIRAG GREEN   YOB: 1990  CSN:                [id number]   MRN:                [id number]  ADMIT DATE:         2022 06:09:00  PHYSICIAN:          Netta Engel MD                            CONSULTATION    DATE OF CONSULT:  2022 00:00:00    REASON FOR CONSULTATION:  Oligohydramnios.    HISTORY OF PRESENT ILLNESS:  A 31-year-old G1 at 33.1 weeks who presented to the   emergency room complaining of contractions, and she was found to have   oligohydramnios at 3.4 cm.  She was hydrated and when I saw her around 7 p.m.   she did not have any complaints and her contractions were gone.  No leaking at   all.  She had reported that she lost her mucus plug a few days ago, but now   leaking since then.  She also reported no bleeding and she reported good fetal   movements.    PAST OB HISTORY:  None.    PAST MEDICAL HISTORY:  None.    PAST GYN HISTORY:  None.    PAST SURGICAL HISTORY:  None.    FAMILY HISTORY:  Noncontributory.    ALLERGIES:  NO KNOWN DRUG ALLERGIES.     MEDICATIONS:  Prenatal vitamins.    REVIEW OF SYSTEMS:  1. No chest pain, no shortness of breath.  2. No constipation or diarrhea.  3. No dysuria.  4. No dizziness.  5. All others are negative.    PHYSICAL EXAM:  GENERAL:  Alert and oriented x3.  VITALS:  Stable, within normal limits.    HEENT:  Normocephalic, atraumatic.    ABDOMEN:  Soft, nontender.  No fundal tenderness.    EXTREMITIES:  No edema.    NONSTRESS TEST:  Fetal heart rate 140-150 with moderate variability,   accelerations 15 x 15, category 1 strip.    Ultrasound in radiology showed FIDEL of 3.4 cm and BPP of 6/8, and breech   presentation.    ASSESSMENT:  31 years old  1 at 33.1 weeks with oligohydramnios.  She was   counseled about the risks of oligohydramnios at this gestational age  including   cord accidents and fetal demises.    RECOMMENDATIONS:    1. I asked RN to perform RM test #2 to rule out rupture of membranes.  2. Will order a followup scan with BPP in the morning.  3. If FIDEL is still less than 5 cm, would proceed with delivery 24 hours after   the 2nd dose of Celestone, or earlier if maternal/fetal status changes.  4. The plan was discussed with Dr. Marcin Jaimes.  Thank you for the opportunity to be involved in her care.    Total time spent was 40 minutes including counseling, chart review, and   coordination of care.        ______________________________  MD VANESA Sánchez/CHANCE  DD:  06/01/2022  Time:  11:05AM  DT:  06/01/2022  Time:  11:27AM  Job #:  [id number]      CONSULTATION

## 2022-06-02 NOTE — TRANSFER OF CARE
Anesthesia Transfer of Care Note    Patient: Melissa Lopez    Procedure(s) Performed: Procedure(s) (LRB):   SECTION (N/A)    Patient location: PACU    Anesthesia Type: spinal    Transport from OR: Transported from OR on room air with adequate spontaneous ventilation    Post pain: adequate analgesia    Post assessment: no apparent anesthetic complications    Post vital signs: stable    Level of consciousness: awake    Nausea/Vomiting: no nausea/vomiting    Complications: none    Transfer of care protocol was followed      Last vitals:   Visit Vitals  /67   Pulse 104   Temp 36.1 °C (97 °F)   Resp 16   Breastfeeding No

## 2022-06-02 NOTE — BRIEF OP NOTE
[facility] - Labor and Delivery  Brief Operative Note    SUMMARY     Surgery Date: 2022     Surgeon(s) and Role:     * Marcin Jaimes MD - Primary     * Luke Jaimes MD - Assisting        Pre-op Diagnosis:  34 weeks gestation of pregnancy [Z3A.34]  Oligohydramnios [O41.00X0]    Post-op Diagnosis:  Post-Op Diagnosis Codes:     * 34 weeks gestation of pregnancy [Z3A.34]     * Oligohydramnios [O41.00X0]    Procedure(s) (LRB):   SECTION (N/A)    Anesthesia: Spinal/Epidural    Operative Findings:    See dictation         Specimens:   Specimen (24h ago, onward)            None          [id number]

## 2022-06-02 NOTE — OP NOTE
[facility]                       [address] [address]    PATIENT NAME:      CHIRAG GREEN   YOB: 1990  CSN:               [id number]  MRN:               [id number]  ADMIT DATE:        2022 06:09:00  PHYSICIAN:         Marcin Jaimes MD                          OPERATIVE REPORT      DATE OF SURGERY:    2022 00:00:00    SURGEON:  Marcin Jaimes MD    ASSISTANT:  Dr. Luke Jaimes.    OPERATION PERFORMED:  Primary low transverse  section.    PREOPERATIVE DIAGNOSIS:  The patient is a 31-year-old white female at 34 weeks   estimated gestational age with oligohydramnios and a breech presentation.    POSTOPERATIVE DIAGNOSIS:  The patient is a 31-year-old white female at 34 weeks   estimated gestational age with oligohydramnios and a breech presentation.    PROCEDURE IN DETAIL:  After proper consents were obtained, the patient brought   to the operating room, placed in supine position, prepped and draped in a   sterile fashion.  A Pfannenstiel skin incision made in the usual fashion. The   abdomen was opened up in layers.  The peritoneum was entered atraumatically.    Bladder flap was created with the Metzenbaum scissors.  Low transverse uterine   incision made without difficulty and the fluid was noted to be clear but scant   in amount.  The infant was delivered in a breech presentation.  The cord was   double clamped, cut, and the infant was handed to the nurses.  The placenta was   removed manually and the uterus was exteriorized onto the abdominal surface. The   inside of the uterus was cleaned with moist laps.  The lower uterine segment   was then closed with #1 chromic running and locking stitch and hemostasis was   noted to be achieved.  The uterus was placed back into the abdominal cavity and   both gutters were wiped cleaned with moist laps.  The lower uterine segment was    then inspected one more time and was noted to be absolutely dry.  Needle and lap   counts were correct x2.  The peritoneum was closed with 2-0 chromic simple   stitch.  The fascia was closed with #1 Vicryl simple running stitch.    Subcutaneous tissues closed with 2-0 plain in an interrupted stitch fashion and   the skin was closed with skin clips.  The patient tolerated the procedure well,   was brought to the recovery room in stable fashion.        ______________________________  Marcin Jaimes MD    CEP/CHANCE  DD:  06/02/2022  Time:  07:29AM  DT:  06/02/2022  Time:  08:45AM  Job #:  [id number]      OPERATIVE REPORT

## 2022-06-02 NOTE — ANESTHESIA PROCEDURE NOTES
Spinal    Diagnosis: Breech Pregnancy  Patient location during procedure: OR  Start time: 6/2/2022 6:28 AM  Timeout: 6/2/2022 6:28 AM  End time: 6/2/2022 6:32 AM    Staffing  Authorizing Provider: Josh Manzo MD  Performing Provider: Josh Manzo MD    Preanesthetic Checklist  Completed: patient identified, IV checked, site marked, risks and benefits discussed, surgical consent, monitors and equipment checked, pre-op evaluation and timeout performed  Spinal Block  Patient position: sitting  Prep: ChloraPrep  Patient monitoring: heart rate, cardiac monitor, continuous pulse ox and frequent blood pressure checks  Approach: midline  Location: L4-5  Injection technique: single shot  CSF Fluid: clear free-flowing CSF  Needle  Needle type: Emily   Needle gauge: 25 G  Needle length: 3.5 in  Additional Documentation: incremental injection, negative aspiration for heme and no paresthesia on injection  Needle localization: anatomical landmarks  Assessment  Sensory level: T5   Dermatomal levels determined by alcohol wipe  Ease of block: easy  Patient's tolerance of the procedure: comfortable throughout block and no complaints  Additional Notes  Sitting position, usual prep & drape, local to skin & subq 25 ga 1% xylocaine (3 ml)  Introducer in, 25 ga Pencan needle, clear csf on 1st pass, no paresthesia, dosed, needle out, to supine    Dose 1.5 ml 0.75% Bupivacaine (in 8.25% dextrose), 100 mcg Duramorph, 10 mcg Fentanyl  Medications:    Medications: bupivacaine (pf) (MARCAINE) injection 0.75% - Intraspinal   1.5 mL - 6/2/2022 6:28:00 AM

## 2022-06-02 NOTE — ANESTHESIA PREPROCEDURE EVALUATION
2022  Melissa Lopez is a 31 y.o., female.  OB History        1    Para        Term                AB        Living           SAB        IAB        Ectopic        Multiple        Live Births                   Oligohydramnios, variable decelerations    Pre-op Assessment    I have reviewed the Patient Summary Reports.     I have reviewed the Nursing Notes. I have reviewed the NPO Status.   I have reviewed the Medications.     Review of Systems  Anesthesia Hx:  No problems with previous Anesthesia  Denies Family Hx of Anesthesia complications.   Denies Personal Hx of Anesthesia complications.   Social:  Non-Smoker    Cardiovascular:   Exercise tolerance: good  Denies Angina.  Denies Orthopnea.  Denies PND.  Denies SHAFFER.  Functional Capacity good / => 4 METS    Musculoskeletal:  Musculoskeletal Normal    Neurological:   Denies TIA. Denies CVA.    Psych:  Psychiatric Normal           Physical Exam  General: Well nourished, Alert and Oriented    Airway:  Mallampati: III   Mouth Opening: Normal  TM Distance: Normal  Tongue: Normal  Neck ROM: Normal ROM    Dental:  Intact    Chest/Lungs:  Clear to auscultation    Heart:  Rate: Normal  Rhythm: Regular Rhythm        Anesthesia Plan  Type of Anesthesia, risks & benefits discussed:    Anesthesia Type: Spinal  Intra-op Monitoring Plan: Standard ASA Monitors  Post Op Pain Control Plan: intrathecal opioid  Induction:  IV  Airway Plan: Direct  Informed Consent: Informed consent signed with the Patient and all parties understand the risks and agree with anesthesia plan.  All questions answered. Patient consented to blood products? Yes  ASA Score: 2 Emergent  Day of Surgery Review of History & Physical: H&P Update referred to the surgeon/provider.  Anesthesia Plan Notes: Risks post spinal headache, backache, high spinal, sensory / motor neuropathy, & failed  spinal with possible General Anesthesia (GETA) explained & patient accepts     Ready For Surgery From Anesthesia Perspective.     .

## 2022-06-02 NOTE — PROGRESS NOTES
[facility]                       [address] [address]    PATIENT NAME:       CHIRAG GREEN   YOB: 1990  CSN:                [id number]   MRN:                [id number]  ADMIT DATE:         2022 06:09:00  PHYSICIAN:          Netta Engel MD                           TESTING    DATE OF SERVICE:  2022 00:00:00    STUDY TYPE:  Second trimester obstetrical ultrasound, biophysical profile,   umbilical artery Dopplers.    DIAGNOSIS:  Intrauterine pregnancy at 33.3 weeks with oligohydramnios.    FINDINGS:  Intrauterine pregnancy was found.  Estimated fetal weight was 2138 g   plus/minus 320 or 4 pounds 11 ounces plus/minus 11 ounces, which is appropriate   for gestational age.    Amniotic fluid was 8.4 cm consistent with oligohydramnios.    Posterior placenta.    Fetal heart rate was 152 during the study.    Biophysical profile was 6/8 with 2 points for breathing, 2 points for gross   movements, 2 points for tone, 0 point for fluid.    Umbilical artery Dopplers were within normal limits with SD ratio at 1.8.    IMPRESSION:  Normal fetal growth and breech presentation with oligohydramnios,   BPP 6/8, normal umbilical artery Dopplers.        ______________________________  Netta Engel MD    ZAH/AQS  DD:  2022  Time:  05:59PM  DT:  2022  Time:  04:15PM  Job #:  [id number]       TESTING

## 2022-06-03 LAB
BASOPHILS # BLD AUTO: 0.01 X10(3)/MCL (ref 0–0.2)
BASOPHILS NFR BLD AUTO: 0.1 %
EOSINOPHIL # BLD AUTO: 0.01 X10(3)/MCL (ref 0–0.9)
EOSINOPHIL NFR BLD AUTO: 0.1 %
ERYTHROCYTE [DISTWIDTH] IN BLOOD BY AUTOMATED COUNT: 12.7 % (ref 11.5–17)
HCT VFR BLD AUTO: 26.2 % (ref 37–47)
HGB BLD-MCNC: 8.2 GM/DL (ref 12–16)
IMM GRANULOCYTES # BLD AUTO: 0.22 X10(3)/MCL (ref 0–0.02)
IMM GRANULOCYTES NFR BLD AUTO: 1.5 % (ref 0–0.43)
LYMPHOCYTES # BLD AUTO: 1.13 X10(3)/MCL (ref 0.6–4.6)
LYMPHOCYTES NFR BLD AUTO: 7.5 %
MCH RBC QN AUTO: 28.7 PG (ref 27–31)
MCHC RBC AUTO-ENTMCNC: 31.3 MG/DL (ref 33–36)
MCV RBC AUTO: 91.6 FL (ref 80–94)
MONOCYTES # BLD AUTO: 0.96 X10(3)/MCL (ref 0.1–1.3)
MONOCYTES NFR BLD AUTO: 6.4 %
NEUTROPHILS # BLD AUTO: 12.8 X10(3)/MCL (ref 2.1–9.2)
NEUTROPHILS NFR BLD AUTO: 84.4 %
NRBC BLD AUTO-RTO: 0 %
PLATELET # BLD AUTO: 191 X10(3)/MCL (ref 130–400)
PMV BLD AUTO: 10.2 FL (ref 9.4–12.4)
RBC # BLD AUTO: 2.86 X10(6)/MCL (ref 4.2–5.4)
WBC # SPEC AUTO: 15.1 X10(3)/MCL (ref 4.5–11.5)

## 2022-06-03 PROCEDURE — 85025 COMPLETE CBC W/AUTO DIFF WBC: CPT | Performed by: OBSTETRICS & GYNECOLOGY

## 2022-06-03 PROCEDURE — 63600175 PHARM REV CODE 636 W HCPCS: Performed by: OBSTETRICS & GYNECOLOGY

## 2022-06-03 PROCEDURE — 25000003 PHARM REV CODE 250: Performed by: OBSTETRICS & GYNECOLOGY

## 2022-06-03 PROCEDURE — 11000001 HC ACUTE MED/SURG PRIVATE ROOM

## 2022-06-03 PROCEDURE — 36415 COLL VENOUS BLD VENIPUNCTURE: CPT | Performed by: OBSTETRICS & GYNECOLOGY

## 2022-06-03 RX ADMIN — KETOROLAC TROMETHAMINE 30 MG: 30 INJECTION, SOLUTION INTRAMUSCULAR at 02:06

## 2022-06-03 RX ADMIN — IBUPROFEN 600 MG: 600 TABLET, FILM COATED ORAL at 11:06

## 2022-06-03 RX ADMIN — DOCUSATE SODIUM 200 MG: 100 CAPSULE, LIQUID FILLED ORAL at 08:06

## 2022-06-03 RX ADMIN — OXYCODONE HYDROCHLORIDE AND ACETAMINOPHEN 1 TABLET: 5; 325 TABLET ORAL at 08:06

## 2022-06-03 NOTE — ANESTHESIA POSTPROCEDURE EVALUATION
Anesthesia Post Evaluation    Patient: Melissa Lopez    Procedure(s) Performed: Procedure(s) (LRB):   SECTION (N/A)    Final Anesthesia Type: spinal      Patient location during evaluation: PACU  Patient participation: Yes- Able to Participate  Level of consciousness: awake and alert, awake and oriented  Post-procedure vital signs: reviewed and stable  Pain management: adequate  Airway patency: patent    PONV status at discharge: No PONV  Anesthetic complications: no      Cardiovascular status: blood pressure returned to baseline and stable  Respiratory status: unassisted  Hydration status: euvolemic  Follow-up not needed.          Vitals Value Taken Time   /81 22 0720   Temp 36.7 °C (98.1 °F) 22 0720   Pulse 73 22 0720   Resp 18 22 0720   SpO2 97 % 22 0824         Event Time   Out of Recovery 08:10:00         Pain/Leann Score: Pain Rating Prior to Med Admin: 4 (6/3/2022  2:12 AM)  Leann Score: 9 (2022  8:24 AM)

## 2022-06-03 NOTE — LACTATION NOTE
Mom says baby has been latching and feeding well without shield but since his labs, and circumcision he is too sleepy to feed. Mom was set up with pump. Mom expressed 25mls in 11minutes.  Baby was fed 10mls of mom's milk.     Encouraged to continue frequent feeds on cue and waking if needed at least every 3 hours. Discussed pumping after and offering 10-15mls of expressed milk if baby was sleepy at breast or didn't feed well.     Reviewed use of pump, cleaning kit, labeling and milk storage and warming. Answered mom and dads questions. Offered further assistance if needed. Verbalized understanding of all.

## 2022-06-04 VITALS
TEMPERATURE: 98 F | SYSTOLIC BLOOD PRESSURE: 120 MMHG | HEART RATE: 106 BPM | DIASTOLIC BLOOD PRESSURE: 81 MMHG | RESPIRATION RATE: 16 BRPM | OXYGEN SATURATION: 97 %

## 2022-06-04 PROCEDURE — 25000003 PHARM REV CODE 250: Performed by: OBSTETRICS & GYNECOLOGY

## 2022-06-04 RX ADMIN — IBUPROFEN 600 MG: 600 TABLET, FILM COATED ORAL at 06:06

## 2022-06-04 RX ADMIN — CIPROFLOXACIN 500 MG: 500 TABLET, FILM COATED ORAL at 01:06

## 2022-06-04 RX ADMIN — CIPROFLOXACIN 500 MG: 500 TABLET, FILM COATED ORAL at 08:06

## 2022-06-04 RX ADMIN — DOCUSATE SODIUM 200 MG: 100 CAPSULE, LIQUID FILLED ORAL at 08:06

## 2022-06-04 NOTE — DISCHARGE SUMMARY
[facility] - 2nd Floor Mother/Baby Unit  Obstetrics  Discharge Summary      Patient Name: Melissa Lopez  MRN: [id number]  Admission Date: 2022  Hospital Length of Stay: 4 days  Discharge Date and Time:  2022 8:48 AM  Attending Physician: Luis Alfredo Atkins MD   Discharging Provider: Luis Alfredo Atkins MD   Primary Care Provider: Primary Doctor No    HPI: s/p section for oligohydramnios        Procedure(s) (LRB):   SECTION (N/A)     Hospital Course:   No notes on file     Consults (From admission, onward)        Status Ordering Provider     Inpatient consult to Maternal Fetal Medicine  Once        Provider:  Lm Johnson MD    Completed LUIS ALFREDO ATKINS          Final Active Diagnoses:    Diagnosis Date Noted POA    PRINCIPAL PROBLEM:  34 weeks gestation of pregnancy [Z3A.34] 2022 Not Applicable      Problems Resolved During this Admission:              Feeding Method: breast    Immunizations     Date Immunization Status Dose Route/Site Given by    22 0818 Tdap Incomplete 0.5 mL Intramuscular/     22 1707 Tdap Incomplete 0.5 mL Intramuscular/ Maggie Selby RN          Delivery:    Episiotomy:     Lacerations:     Repair suture:     Repair # of packets:     Blood loss (ml):       Birth information:  YOB: 2020   Time of birth: 6:50 AM   Sex: male   Delivery type: , Low Transverse   Gestational Age: 34w1d    Delivery Clinician:      Other providers:       Additional  information:  Forceps:    Vacuum:    Breech:    Observed anomalies nicu attended for decelerations. handoff to mariela @ 0700am.     Living?:           APGARS  One minute Five minutes Ten minutes   Skin color:         Heart rate:         Grimace:         Muscle tone:         Breathing:         Totals: 9  10        Placenta: Delivered:       appearance    Pending Diagnostic Studies:     None          Discharged Condition: good    Disposition: Home or Self Care    Follow  Up:   Follow-up Information     Marcin Jaimes MD Follow up.    Specialty: Obstetrics and Gynecology  Contact information:  [facility] [address]  [phone]             Marcin Jaimes .                     Patient Instructions:   No discharge procedures on file.  Medications:  Current Discharge Medication List      STOP taking these medications       prenatal vit/iron fum/folic ac (PRENATAL 1+1 ORAL) Comments:   Reason for Stopping:               Marcin Jaimes MD  Obstetrics  [facility] - 2nd Floor Mother/Baby Unit

## 2022-06-07 ENCOUNTER — PATIENT OUTREACH (OUTPATIENT)
Dept: ADMINISTRATIVE | Facility: CLINIC | Age: 32
End: 2022-06-07
Payer: COMMERCIAL

## 2022-06-07 NOTE — PROGRESS NOTES
C3 nurse spoke with Melissa Lopez   for a TCC post hospital discharge follow up call. The patient does not have a scheduled HOSFU appointment with Primary Doctor No   within 7 days post hospital discharge date will make apt. . C3 nurse was unable to schedule HOSFU appointment in Epic

## 2023-05-08 PROBLEM — Z3A.33 33 WEEKS GESTATION OF PREGNANCY: Status: RESOLVED | Noted: 2022-05-31 | Resolved: 2023-05-08

## 2023-05-15 PROBLEM — Z3A.34 34 WEEKS GESTATION OF PREGNANCY: Status: RESOLVED | Noted: 2022-06-02 | Resolved: 2023-05-15

## 2024-12-16 ENCOUNTER — HOSPITAL ENCOUNTER (EMERGENCY)
Facility: HOSPITAL | Age: 34
Discharge: HOME | End: 2024-12-16
Attending: EMERGENCY MEDICINE
Payer: COMMERCIAL

## 2024-12-16 VITALS
DIASTOLIC BLOOD PRESSURE: 83 MMHG | BODY MASS INDEX: 19.84 KG/M2 | HEIGHT: 63 IN | TEMPERATURE: 98 F | OXYGEN SATURATION: 100 % | HEART RATE: 100 BPM | RESPIRATION RATE: 18 BRPM | WEIGHT: 112 LBS | SYSTOLIC BLOOD PRESSURE: 117 MMHG

## 2024-12-16 DIAGNOSIS — O21.9 NAUSEA AND VOMITING IN PREGNANCY: Primary | ICD-10-CM

## 2024-12-16 LAB
ALBUMIN SERPL-MCNC: 3.8 G/DL (ref 3.5–5)
ALBUMIN/GLOB SERPL: 1 RATIO (ref 1.1–2)
ALP SERPL-CCNC: 61 UNIT/L (ref 40–150)
ALT SERPL-CCNC: 11 UNIT/L (ref 0–55)
ANION GAP SERPL CALC-SCNC: 10 MEQ/L
AST SERPL-CCNC: 15 UNIT/L (ref 5–34)
BASOPHILS # BLD AUTO: 0.03 X10(3)/MCL
BASOPHILS NFR BLD AUTO: 0.2 %
BILIRUB SERPL-MCNC: 0.7 MG/DL
BUN SERPL-MCNC: 11.6 MG/DL (ref 7–18.7)
CALCIUM SERPL-MCNC: 9.4 MG/DL (ref 8.4–10.2)
CHLORIDE SERPL-SCNC: 102 MMOL/L (ref 98–107)
CO2 SERPL-SCNC: 24 MMOL/L (ref 22–29)
CREAT SERPL-MCNC: 0.71 MG/DL (ref 0.55–1.02)
CREAT/UREA NIT SERPL: 16
EOSINOPHIL # BLD AUTO: 0.01 X10(3)/MCL (ref 0–0.9)
EOSINOPHIL NFR BLD AUTO: 0.1 %
ERYTHROCYTE [DISTWIDTH] IN BLOOD BY AUTOMATED COUNT: 12.9 % (ref 11.5–17)
GFR SERPLBLD CREATININE-BSD FMLA CKD-EPI: >60 ML/MIN/1.73/M2
GLOBULIN SER-MCNC: 3.8 GM/DL (ref 2.4–3.5)
GLUCOSE SERPL-MCNC: 91 MG/DL (ref 74–100)
HCT VFR BLD AUTO: 40.8 % (ref 37–47)
HGB BLD-MCNC: 14.1 G/DL (ref 12–16)
IMM GRANULOCYTES # BLD AUTO: 0.07 X10(3)/MCL (ref 0–0.04)
IMM GRANULOCYTES NFR BLD AUTO: 0.6 %
LYMPHOCYTES # BLD AUTO: 0.34 X10(3)/MCL (ref 0.6–4.6)
LYMPHOCYTES NFR BLD AUTO: 2.7 %
MAGNESIUM SERPL-MCNC: 1.5 MG/DL (ref 1.6–2.6)
MCH RBC QN AUTO: 29.3 PG (ref 27–31)
MCHC RBC AUTO-ENTMCNC: 34.6 G/DL (ref 33–36)
MCV RBC AUTO: 84.6 FL (ref 80–94)
MONOCYTES # BLD AUTO: 0.38 X10(3)/MCL (ref 0.1–1.3)
MONOCYTES NFR BLD AUTO: 3 %
NEUTROPHILS # BLD AUTO: 11.78 X10(3)/MCL (ref 2.1–9.2)
NEUTROPHILS NFR BLD AUTO: 93.4 %
NRBC BLD AUTO-RTO: 0 %
PLATELET # BLD AUTO: 272 X10(3)/MCL (ref 130–400)
PMV BLD AUTO: 9.5 FL (ref 7.4–10.4)
POTASSIUM SERPL-SCNC: 3.8 MMOL/L (ref 3.5–5.1)
PROT SERPL-MCNC: 7.6 GM/DL (ref 6.4–8.3)
RBC # BLD AUTO: 4.82 X10(6)/MCL (ref 4.2–5.4)
SODIUM SERPL-SCNC: 136 MMOL/L (ref 136–145)
WBC # BLD AUTO: 12.61 X10(3)/MCL (ref 4.5–11.5)

## 2024-12-16 PROCEDURE — 85025 COMPLETE CBC W/AUTO DIFF WBC: CPT | Performed by: EMERGENCY MEDICINE

## 2024-12-16 PROCEDURE — 99284 EMERGENCY DEPT VISIT MOD MDM: CPT

## 2024-12-16 PROCEDURE — 83735 ASSAY OF MAGNESIUM: CPT | Performed by: EMERGENCY MEDICINE

## 2024-12-16 PROCEDURE — 96375 TX/PRO/DX INJ NEW DRUG ADDON: CPT

## 2024-12-16 PROCEDURE — 96365 THER/PROPH/DIAG IV INF INIT: CPT

## 2024-12-16 PROCEDURE — 63600175 PHARM REV CODE 636 W HCPCS: Performed by: EMERGENCY MEDICINE

## 2024-12-16 PROCEDURE — 80053 COMPREHEN METABOLIC PANEL: CPT | Performed by: EMERGENCY MEDICINE

## 2024-12-16 PROCEDURE — 96361 HYDRATE IV INFUSION ADD-ON: CPT

## 2024-12-16 RX ORDER — MAGNESIUM SULFATE HEPTAHYDRATE 40 MG/ML
2 INJECTION, SOLUTION INTRAVENOUS
Status: COMPLETED | OUTPATIENT
Start: 2024-12-16 | End: 2024-12-16

## 2024-12-16 RX ORDER — ONDANSETRON HYDROCHLORIDE 2 MG/ML
4 INJECTION, SOLUTION INTRAVENOUS
Status: COMPLETED | OUTPATIENT
Start: 2024-12-16 | End: 2024-12-16

## 2024-12-16 RX ADMIN — ONDANSETRON 4 MG: 2 INJECTION INTRAMUSCULAR; INTRAVENOUS at 12:12

## 2024-12-16 RX ADMIN — SODIUM CHLORIDE, POTASSIUM CHLORIDE, SODIUM LACTATE AND CALCIUM CHLORIDE 1000 ML: 600; 310; 30; 20 INJECTION, SOLUTION INTRAVENOUS at 12:12

## 2024-12-16 RX ADMIN — MAGNESIUM SULFATE HEPTAHYDRATE 2 G: 40 INJECTION, SOLUTION INTRAVENOUS at 01:12

## 2024-12-16 NOTE — ED TRIAGE NOTES
Pt complaiant of recurrent of vomititng and general weakness with sore abd relating that she is 11 weeks pregnant and has been vomiting since 0400. Pt under care of Dr Vasquez Salguero

## 2024-12-16 NOTE — ED PROVIDER NOTES
Encounter Date: 2024       History     Chief Complaint   Patient presents with    Emesis During Pregnancy     Pt complaiant of recurrent of vomititng and general weakness with sore abd relating that she is 11 weeks pregnant and has been vomiting since 0400.     The history is provided by the patient.   Emesis   This is a new problem. The current episode started today. The problem occurs 5 - 10 times per day. The problem has been unchanged. The emesis has an appearance of bilious material. Pertinent negatives include no arthralgias, no diarrhea, no fever and no myalgias.   Patient is 11 weeks gestation, followed by Dr. MARTA Salguero.  She has had mild nausea for most of the pregnancy but this AM, the symptoms are much worse.  Had to leave work due to N/V.  Denies abdominal pain but states muscles are sore from wretching.    Review of patient's allergies indicates:   Allergen Reactions    Sulfa (sulfonamide antibiotics) Anaphylaxis     Past Medical History:   Diagnosis Date    Thyroid disease      Past Surgical History:   Procedure Laterality Date     SECTION N/A 2022    Procedure:  SECTION;  Surgeon: Vasquez Salguero MD;  [facility];  Service: OB/GYN;  Laterality: N/A;    TONSILLECTOMY       Family History   Problem Relation Name Age of Onset    Breast cancer Mother       Social History     Tobacco Use    Smoking status: Never    Smokeless tobacco: Never   Substance Use Topics    Alcohol use: Not Currently    Drug use: Not Currently     Review of Systems   Constitutional:  Negative for fever.   HENT:  Negative for sore throat.    Respiratory:  Negative for shortness of breath.    Cardiovascular:  Negative for chest pain.   Gastrointestinal:  Positive for vomiting. Negative for diarrhea and nausea.   Genitourinary:  Negative for dysuria.   Musculoskeletal:  Negative for arthralgias, back pain and myalgias.   Skin:  Negative for rash.   Neurological:  Negative for weakness.    Hematological:  Does not bruise/bleed easily.       Physical Exam     Initial Vitals [12/16/24 1211]   BP Pulse Resp Temp SpO2   117/83 104 18 98.2 °F (36.8 °C) 100 %      MAP       --         Physical Exam    Nursing note and vitals reviewed.  Constitutional: She appears well-developed and well-nourished.   HENT:   Head: Normocephalic and atraumatic.   Right Ear: External ear normal.   Left Ear: External ear normal. Mouth/Throat: Mucous membranes are dry.   Eyes: Conjunctivae and EOM are normal. Pupils are equal, round, and reactive to light.   Neck: Neck supple.   Normal range of motion.  Cardiovascular:  Regular rhythm, normal heart sounds and intact distal pulses.   Tachycardia present.         Pulmonary/Chest: Breath sounds normal.   Abdominal: Abdomen is soft. Bowel sounds are normal.   Musculoskeletal:         General: Normal range of motion.      Cervical back: Normal range of motion and neck supple.     Neurological: She is alert and oriented to person, place, and time. GCS score is 15. GCS eye subscore is 4. GCS verbal subscore is 5. GCS motor subscore is 6.   Skin: Skin is warm and dry. Capillary refill takes less than 2 seconds.   Psychiatric: She has a normal mood and affect. Her behavior is normal. Judgment and thought content normal.         ED Course   Procedures  Labs Reviewed   COMPREHENSIVE METABOLIC PANEL - Abnormal       Result Value    Sodium 136      Potassium 3.8      Chloride 102      CO2 24      Glucose 91      Blood Urea Nitrogen 11.6      Creatinine 0.71      Calcium 9.4      Protein Total 7.6      Albumin 3.8      Globulin 3.8 (*)     Albumin/Globulin Ratio 1.0 (*)     Bilirubin Total 0.7      ALP 61      ALT 11      AST 15      eGFR >60      Anion Gap 10.0      BUN/Creatinine Ratio 16     MAGNESIUM - Abnormal    Magnesium Level 1.50 (*)    CBC WITH DIFFERENTIAL - Abnormal    WBC 12.61 (*)     RBC 4.82      Hgb 14.1      Hct 40.8      MCV 84.6      MCH 29.3      MCHC 34.6      RDW 12.9       Platelet 272      MPV 9.5      Neut % 93.4      Lymph % 2.7      Mono % 3.0      Eos % 0.1      Basophil % 0.2      Lymph # 0.34 (*)     Neut # 11.78 (*)     Mono # 0.38      Eos # 0.01      Baso # 0.03      IG# 0.07 (*)     IG% 0.6      NRBC% 0.0     CBC W/ AUTO DIFFERENTIAL    Narrative:     The following orders were created for panel order CBC auto differential.  Procedure                               Abnormality         Status                     ---------                               -----------         ------                     CBC with Differential[[id number]]        Abnormal            Final result                 Please view results for these tests on the individual orders.          Imaging Results    None          Medications   lactated ringers bolus 1,000 mL (1,000 mLs Intravenous New Bag 12/16/24 1230)   ondansetron injection 4 mg (4 mg Intravenous Given 12/16/24 1228)   magnesium sulfate 2g in water 50mL IVPB (premix) (2 g Intravenous New Bag 12/16/24 1332)     Medical Decision Making  Amount and/or Complexity of Data Reviewed  Labs: ordered. Decision-making details documented in ED Course.    Risk  Prescription drug management.    Differential includes:  hyperemesis, viral illness, gastritis, PUD.  Will give IVF bolus and antiemetic and obtain CBC, CMP, mag.           ED Course as of 12/16/24 1403   Mon Dec 16, 2024   1359 Feeling better after IVF and meds.  Tolerating PO fluids.  Discussed labs and reassured.  She has ondansetron ODT at home for N/V, but did not take today.  Encouraged use for significant N/V.  She has F/U with Dr. Salguero on 12/26 - encouraged to keep her appointment and to return to ED for worsening symptoms. [CL]      ED Course User Index  [CL] Valerio Fisher MD                           Clinical Impression:  Final diagnoses:  [O21.9] Nausea and vomiting in pregnancy (Primary)          ED Disposition Condition    Discharge Stable          ED Prescriptions    None        Follow-up Information       Follow up With Specialties Details Why Contact Info    Keep your appointment 12/26 with Dr. Salguero.                 Valerio Fisher MD  12/16/24 [id number]

## 2024-12-19 ENCOUNTER — LAB VISIT (OUTPATIENT)
Dept: LAB | Facility: HOSPITAL | Age: 34
End: 2024-12-19
Attending: OBSTETRICS & GYNECOLOGY
Payer: COMMERCIAL

## 2024-12-19 DIAGNOSIS — Z34.91 FIRST TRIMESTER PREGNANCY: Primary | ICD-10-CM

## 2024-12-19 LAB
ERYTHROCYTE [DISTWIDTH] IN BLOOD BY AUTOMATED COUNT: 13 % (ref 11.5–17)
GROUP & RH: NORMAL
HBV SURFACE AG SERPL QL IA: NONREACTIVE
HCT VFR BLD AUTO: 35.1 % (ref 37–47)
HGB BLD-MCNC: 12 G/DL (ref 12–16)
HGB S BLD QL SOLY: NEGATIVE
HIV 1+2 AB+HIV1 P24 AG SERPL QL IA: NONREACTIVE
INDIRECT COOMBS: NORMAL
MCH RBC QN AUTO: 29.4 PG (ref 27–31)
MCHC RBC AUTO-ENTMCNC: 34.2 G/DL (ref 33–36)
MCV RBC AUTO: 86 FL (ref 80–94)
NRBC BLD AUTO-RTO: 0 %
PLATELET # BLD AUTO: 252 X10(3)/MCL (ref 130–400)
PMV BLD AUTO: 9.3 FL (ref 7.4–10.4)
RBC # BLD AUTO: 4.08 X10(6)/MCL (ref 4.2–5.4)
SPECIMEN OUTDATE: NORMAL
T PALLIDUM AB SER QL: NONREACTIVE
WBC # BLD AUTO: 6.4 X10(3)/MCL (ref 4.5–11.5)

## 2024-12-19 PROCEDURE — 87389 HIV-1 AG W/HIV-1&-2 AB AG IA: CPT

## 2024-12-19 PROCEDURE — 36415 COLL VENOUS BLD VENIPUNCTURE: CPT

## 2024-12-19 PROCEDURE — 87340 HEPATITIS B SURFACE AG IA: CPT

## 2024-12-19 PROCEDURE — 85660 RBC SICKLE CELL TEST: CPT

## 2024-12-19 PROCEDURE — 86900 BLOOD TYPING SEROLOGIC ABO: CPT | Performed by: OBSTETRICS & GYNECOLOGY

## 2024-12-19 PROCEDURE — 86762 RUBELLA ANTIBODY: CPT

## 2024-12-19 PROCEDURE — 87086 URINE CULTURE/COLONY COUNT: CPT

## 2024-12-19 PROCEDURE — 85027 COMPLETE CBC AUTOMATED: CPT

## 2024-12-19 PROCEDURE — 86780 TREPONEMA PALLIDUM: CPT

## 2024-12-20 LAB
RUBV IGG SERPL IA-ACNC: 4
RUBV IGG SERPL QL IA: POSITIVE

## 2024-12-21 LAB — BACTERIA UR CULT: NO GROWTH

## 2025-03-28 ENCOUNTER — LAB VISIT (OUTPATIENT)
Dept: LAB | Facility: HOSPITAL | Age: 35
End: 2025-03-28
Attending: OBSTETRICS & GYNECOLOGY
Payer: COMMERCIAL

## 2025-03-28 DIAGNOSIS — Z34.80 PRENATAL CARE, SUBSEQUENT PREGNANCY: Primary | ICD-10-CM

## 2025-03-28 LAB
ERYTHROCYTE [DISTWIDTH] IN BLOOD BY AUTOMATED COUNT: 12.6 % (ref 11.5–17)
GLUCOSE 1H P 100 G GLC PO SERPL-MCNC: 108 MG/DL (ref 100–180)
HCT VFR BLD AUTO: 32.6 % (ref 37–47)
HGB BLD-MCNC: 10.2 G/DL (ref 12–16)
MCH RBC QN AUTO: 27.5 PG (ref 27–31)
MCHC RBC AUTO-ENTMCNC: 31.3 G/DL (ref 33–36)
MCV RBC AUTO: 87.9 FL (ref 80–94)
NRBC BLD AUTO-RTO: 0 %
PLATELET # BLD AUTO: 226 X10(3)/MCL (ref 130–400)
PMV BLD AUTO: 9.4 FL (ref 7.4–10.4)
RBC # BLD AUTO: 3.71 X10(6)/MCL (ref 4.2–5.4)
WBC # BLD AUTO: 8.61 X10(3)/MCL (ref 4.5–11.5)

## 2025-03-28 PROCEDURE — 82950 GLUCOSE TEST: CPT

## 2025-03-28 PROCEDURE — 85027 COMPLETE CBC AUTOMATED: CPT

## 2025-03-28 PROCEDURE — 36415 COLL VENOUS BLD VENIPUNCTURE: CPT

## 2025-06-23 ENCOUNTER — ANESTHESIA EVENT (OUTPATIENT)
Dept: OBSTETRICS AND GYNECOLOGY | Facility: HOSPITAL | Age: 35
End: 2025-06-23
Payer: COMMERCIAL

## 2025-06-24 ENCOUNTER — HOSPITAL ENCOUNTER (EMERGENCY)
Facility: HOSPITAL | Age: 35
Discharge: HOME | End: 2025-06-24
Payer: COMMERCIAL

## 2025-06-24 VITALS — HEART RATE: 86 BPM | SYSTOLIC BLOOD PRESSURE: 124 MMHG | OXYGEN SATURATION: 99 % | DIASTOLIC BLOOD PRESSURE: 83 MMHG

## 2025-06-24 PROCEDURE — 99284 EMERGENCY DEPT VISIT MOD MDM: CPT

## 2025-06-24 NOTE — ED PROVIDER NOTES
[facility] NOTE     Admit Date: 2025  [facility] Physician: Yariel Melgar  Primary OBGYN: Dr. Vasquez Salguero    Chief Complaint   Patient presents with    Abdominal Pain     Patient went in to MARTA Salguero office, complaining of ctx; sent to triage to check cervix        Hospital Course:  Irene Resendez is a 34 y.o.  at 38w1d presents complaining of irregular uterine contractions.  Patient wanted to have a cervical exam but her primary OB was not in the office.    This IUP is complicated by recent evaluation for gestational hypertension.  Workup is in progress from the primary care OB office..    Patient denies vaginal bleeding, leakage of fluid, headache, vision changes, RUQ pain, dysuria, fever, and nausea/vomiting.  Fetal Movement: normal.      BP (!) 130/90   Pulse 88   LMP 2024 (Exact Date)   SpO2 99%   Pulse:  [88-95] 88  SpO2:  [99 %-100 %] 99 %  BP: (130-162)/(88-90) 130/90    General: in no apparent distress  Cardiovascular: regular rate and rhythm no murmurs  Respiratory: clear to auscultation, no wheezes, rales or rhonchi, symmetric air entry  Abdominal: FHT present  Back: lumbar tenderness present CVA tenderness none  Extremeties no redness or tenderness in the calves or thighs    SSE:   SVE:  One, 60%, -3, membranes intact      FHT: Category 1  TOCO:  No contractions are observed.    Medical Decision Making:  Patient's blood pressures have remained stable during observation.  No contractions are noted.  Patient will be discharged to follow up with primary OB service.    LABS:   No results found for this or any previous visit (from the past 24 hours).  [unfilled]     Imaging Results    None          ASSESMENT: Irene Resendez is a 34 y.o.   at 38w1d with concerns about uterine contractions.  Evaluation for gestational hypertension underway.    Discharge Diagnosis/Clinical Impression:  Pregnancy 38 weeks.  Gestational hypertension.  Uterine  inertia.    Status:Improved/stable    Disposition:  discharged to home    Medications:   Tylenol for discomfort    Patient Instructions:   There are no discharge medications for this patient.      - Pt was given routine pregnancy instructions including to return to triage if she had any vaginal bleeding (other than spotting for the next 48hrs), any loss of fluid like her water broke, decreased fetal movement, or contractions Q 5min lasting for 2 or more hours. Pt was also instructed to drink copious water. Patient voiced understanding of all these instructions and was subsequently discharged home.    She will follow up with her primary OB.    No discharge procedures on file.    Yariel Melgar MD  OB-GYN Hospitalist     Yariel Melgar MD  06/24/25 [id number]

## 2025-06-26 ENCOUNTER — LAB VISIT (OUTPATIENT)
Dept: LAB | Facility: HOSPITAL | Age: 35
End: 2025-06-26
Attending: OBSTETRICS & GYNECOLOGY
Payer: COMMERCIAL

## 2025-06-26 ENCOUNTER — HOSPITAL ENCOUNTER (OUTPATIENT)
Facility: HOSPITAL | Age: 35
Discharge: HOME | End: 2025-06-26
Attending: OBSTETRICS & GYNECOLOGY | Admitting: OBSTETRICS & GYNECOLOGY
Payer: COMMERCIAL

## 2025-06-26 VITALS
BODY MASS INDEX: 25.69 KG/M2 | HEIGHT: 63 IN | HEART RATE: 73 BPM | DIASTOLIC BLOOD PRESSURE: 82 MMHG | SYSTOLIC BLOOD PRESSURE: 134 MMHG | WEIGHT: 145 LBS | OXYGEN SATURATION: 100 %

## 2025-06-26 DIAGNOSIS — O16.9 HYPERTENSION AFFECTING PREGNANCY: ICD-10-CM

## 2025-06-26 DIAGNOSIS — O12.13 GESTATIONAL PROTEINURIA IN THIRD TRIMESTER: Primary | ICD-10-CM

## 2025-06-26 LAB
ALBUMIN SERPL-MCNC: 2.4 G/DL (ref 3.5–5)
ALBUMIN/GLOB SERPL: 0.7 RATIO (ref 1.1–2)
ALP SERPL-CCNC: 149 UNIT/L (ref 40–150)
ALT SERPL-CCNC: 7 UNIT/L (ref 0–55)
ANION GAP SERPL CALC-SCNC: 8 MEQ/L
AST SERPL-CCNC: 13 UNIT/L (ref 11–45)
BASOPHILS # BLD AUTO: 0.04 X10(3)/MCL
BASOPHILS NFR BLD AUTO: 0.3 %
BILIRUB SERPL-MCNC: 0.3 MG/DL
BUN SERPL-MCNC: 6.6 MG/DL (ref 7–18.7)
CALCIUM SERPL-MCNC: 8.1 MG/DL (ref 8.4–10.2)
CHLORIDE SERPL-SCNC: 108 MMOL/L (ref 98–107)
CO2 SERPL-SCNC: 23 MMOL/L (ref 22–29)
CREAT CL 24H UR+SERPL-VRATE: 105.4 ML/MIN (ref 70–157)
CREAT SERPL-MCNC: 0.65 MG/DL (ref 0.55–1.02)
CREAT SERPL-MCNC: 0.69 MG/DL (ref 0.55–1.02)
CREAT SERPL-MCNC: 0.69 MG/DL (ref 0.55–1.02)
CREAT UR-MCNC: 87.3 MG/DL (ref 45–106)
CREAT/UREA NIT SERPL: 10
CREATININE, SER - MANUAL VALUE (OHS): 0.69
EOSINOPHIL # BLD AUTO: 0.01 X10(3)/MCL (ref 0–0.9)
EOSINOPHIL NFR BLD AUTO: 0.1 %
ERYTHROCYTE [DISTWIDTH] IN BLOOD BY AUTOMATED COUNT: 14.9 % (ref 11.5–17)
GFR SERPLBLD CREATININE-BSD FMLA CKD-EPI: >60 ML/MIN/1.73/M2
GFR SERPLBLD CREATININE-BSD FMLA CKD-EPI: >60 ML/MIN/1.73/M2
GLOBULIN SER-MCNC: 3.4 GM/DL (ref 2.4–3.5)
GLUCOSE SERPL-MCNC: 74 MG/DL (ref 74–100)
HCT VFR BLD AUTO: 29.6 % (ref 37–47)
HGB BLD-MCNC: 8.7 G/DL (ref 12–16)
HRS COLLECTED CREATININE (OHS): 24 HR
IMM GRANULOCYTES # BLD AUTO: 0.25 X10(3)/MCL (ref 0–0.04)
IMM GRANULOCYTES NFR BLD AUTO: 2.1 %
LDH SERPL-CCNC: 196 U/L (ref 125–220)
LYMPHOCYTES # BLD AUTO: 1.78 X10(3)/MCL (ref 0.6–4.6)
LYMPHOCYTES NFR BLD AUTO: 14.9 %
MCH RBC QN AUTO: 22.9 PG (ref 27–31)
MCHC RBC AUTO-ENTMCNC: 29.4 G/DL (ref 33–36)
MCV RBC AUTO: 77.9 FL (ref 80–94)
MONOCYTES # BLD AUTO: 0.6 X10(3)/MCL (ref 0.1–1.3)
MONOCYTES NFR BLD AUTO: 5 %
NEUTROPHILS # BLD AUTO: 9.23 X10(3)/MCL (ref 2.1–9.2)
NEUTROPHILS NFR BLD AUTO: 77.6 %
NRBC BLD AUTO-RTO: 0 %
PLATELET # BLD AUTO: 176 X10(3)/MCL (ref 130–400)
PMV BLD AUTO: 11.8 FL (ref 7.4–10.4)
POTASSIUM SERPL-SCNC: 4.5 MMOL/L (ref 3.5–5.1)
PROT 24H UR-MCNC: 853.2 MG/24 H (ref 0–165)
PROT SERPL-MCNC: 5.8 GM/DL (ref 6.4–8.3)
PROT UR STRIP-MCNC: 71.1 MG/DL
RBC # BLD AUTO: 3.8 X10(6)/MCL (ref 4.2–5.4)
SODIUM SERPL-SCNC: 139 MMOL/L (ref 136–145)
TOTAL VOLUME  (OHS): 1200 ML
TOTAL VOLUME  (OHS): 1200 ML
URATE SERPL-MCNC: 4.6 MG/DL (ref 2.6–6)
WBC # BLD AUTO: 11.91 X10(3)/MCL (ref 4.5–11.5)

## 2025-06-26 PROCEDURE — 84156 ASSAY OF PROTEIN URINE: CPT

## 2025-06-26 PROCEDURE — 85025 COMPLETE CBC W/AUTO DIFF WBC: CPT | Performed by: OBSTETRICS & GYNECOLOGY

## 2025-06-26 PROCEDURE — 84550 ASSAY OF BLOOD/URIC ACID: CPT | Performed by: OBSTETRICS & GYNECOLOGY

## 2025-06-26 PROCEDURE — 83615 LACTATE (LD) (LDH) ENZYME: CPT | Performed by: OBSTETRICS & GYNECOLOGY

## 2025-06-26 PROCEDURE — G0379 DIRECT REFER HOSPITAL OBSERV: HCPCS

## 2025-06-26 PROCEDURE — G0378 HOSPITAL OBSERVATION PER HR: HCPCS

## 2025-06-26 PROCEDURE — 82575 CREATININE CLEARANCE TEST: CPT

## 2025-06-26 PROCEDURE — 80053 COMPREHEN METABOLIC PANEL: CPT | Performed by: OBSTETRICS & GYNECOLOGY

## 2025-06-30 ENCOUNTER — HOSPITAL ENCOUNTER (INPATIENT)
Facility: HOSPITAL | Age: 35
LOS: 3 days | Discharge: HOME | End: 2025-07-03
Attending: OBSTETRICS & GYNECOLOGY | Admitting: OBSTETRICS & GYNECOLOGY
Payer: COMMERCIAL

## 2025-06-30 DIAGNOSIS — Z87.59 CESAREAN DELIV DUE TO PREVIOUS DIFFICULT DELIV, DELIV, CURR HOSPITALIZ: Primary | ICD-10-CM

## 2025-06-30 LAB
ALBUMIN SERPL-MCNC: 2.2 G/DL (ref 3.5–5)
ALBUMIN SERPL-MCNC: 2.7 G/DL (ref 3.5–5)
ALBUMIN/GLOB SERPL: 0.6 RATIO (ref 1.1–2)
ALBUMIN/GLOB SERPL: 0.8 RATIO (ref 1.1–2)
ALP SERPL-CCNC: 133 UNIT/L (ref 40–150)
ALP SERPL-CCNC: 163 UNIT/L (ref 40–150)
ALT SERPL-CCNC: 7 UNIT/L (ref 0–55)
ALT SERPL-CCNC: 9 UNIT/L (ref 0–55)
ANION GAP SERPL CALC-SCNC: 7 MEQ/L
ANION GAP SERPL CALC-SCNC: 8 MEQ/L
AST SERPL-CCNC: 15 UNIT/L (ref 11–45)
AST SERPL-CCNC: 16 UNIT/L (ref 11–45)
BASOPHILS # BLD AUTO: 0.03 X10(3)/MCL
BASOPHILS # BLD AUTO: 0.04 X10(3)/MCL
BASOPHILS NFR BLD AUTO: 0.2 %
BASOPHILS NFR BLD AUTO: 0.4 %
BILIRUB SERPL-MCNC: 0.2 MG/DL
BILIRUB SERPL-MCNC: 0.4 MG/DL
BUN SERPL-MCNC: 6.3 MG/DL (ref 7–18.7)
BUN SERPL-MCNC: 7.4 MG/DL (ref 7–18.7)
CALCIUM SERPL-MCNC: 8.2 MG/DL (ref 8.4–10.2)
CALCIUM SERPL-MCNC: 8.4 MG/DL (ref 8.4–10.2)
CHLORIDE SERPL-SCNC: 105 MMOL/L (ref 98–107)
CHLORIDE SERPL-SCNC: 107 MMOL/L (ref 98–107)
CO2 SERPL-SCNC: 24 MMOL/L (ref 22–29)
CO2 SERPL-SCNC: 25 MMOL/L (ref 22–29)
CREAT SERPL-MCNC: 0.65 MG/DL (ref 0.55–1.02)
CREAT SERPL-MCNC: 0.68 MG/DL (ref 0.55–1.02)
CREAT/UREA NIT SERPL: 10
CREAT/UREA NIT SERPL: 11
EOSINOPHIL # BLD AUTO: 0 X10(3)/MCL (ref 0–0.9)
EOSINOPHIL # BLD AUTO: 0.03 X10(3)/MCL (ref 0–0.9)
EOSINOPHIL NFR BLD AUTO: 0 %
EOSINOPHIL NFR BLD AUTO: 0.3 %
ERYTHROCYTE [DISTWIDTH] IN BLOOD BY AUTOMATED COUNT: 15 % (ref 11.5–17)
ERYTHROCYTE [DISTWIDTH] IN BLOOD BY AUTOMATED COUNT: 15.1 % (ref 11.5–17)
GFR SERPLBLD CREATININE-BSD FMLA CKD-EPI: >60 ML/MIN/1.73/M2
GFR SERPLBLD CREATININE-BSD FMLA CKD-EPI: >60 ML/MIN/1.73/M2
GLOBULIN SER-MCNC: 3.5 GM/DL (ref 2.4–3.5)
GLOBULIN SER-MCNC: 3.6 GM/DL (ref 2.4–3.5)
GLUCOSE SERPL-MCNC: 67 MG/DL (ref 74–100)
GLUCOSE SERPL-MCNC: 85 MG/DL (ref 74–100)
GROUP & RH: NORMAL
HCT VFR BLD AUTO: 29 % (ref 37–47)
HCT VFR BLD AUTO: 32.2 % (ref 37–47)
HGB BLD-MCNC: 8.7 G/DL (ref 12–16)
HGB BLD-MCNC: 9.3 G/DL (ref 12–16)
IMM GRANULOCYTES # BLD AUTO: 0.23 X10(3)/MCL (ref 0–0.04)
IMM GRANULOCYTES # BLD AUTO: 0.38 X10(3)/MCL (ref 0–0.04)
IMM GRANULOCYTES NFR BLD AUTO: 2.2 %
IMM GRANULOCYTES NFR BLD AUTO: 2.6 %
INDIRECT COOMBS: NORMAL
LDH SERPL-CCNC: 194 U/L (ref 125–220)
LYMPHOCYTES # BLD AUTO: 0.74 X10(3)/MCL (ref 0.6–4.6)
LYMPHOCYTES # BLD AUTO: 1.81 X10(3)/MCL (ref 0.6–4.6)
LYMPHOCYTES NFR BLD AUTO: 17.4 %
LYMPHOCYTES NFR BLD AUTO: 5.1 %
MCH RBC QN AUTO: 22.4 PG (ref 27–31)
MCH RBC QN AUTO: 22.5 PG (ref 27–31)
MCHC RBC AUTO-ENTMCNC: 28.9 G/DL (ref 33–36)
MCHC RBC AUTO-ENTMCNC: 30 G/DL (ref 33–36)
MCV RBC AUTO: 75.1 FL (ref 80–94)
MCV RBC AUTO: 77.6 FL (ref 80–94)
MONOCYTES # BLD AUTO: 0.13 X10(3)/MCL (ref 0.1–1.3)
MONOCYTES # BLD AUTO: 0.52 X10(3)/MCL (ref 0.1–1.3)
MONOCYTES NFR BLD AUTO: 0.9 %
MONOCYTES NFR BLD AUTO: 5 %
NEUTROPHILS # BLD AUTO: 13.37 X10(3)/MCL (ref 2.1–9.2)
NEUTROPHILS # BLD AUTO: 7.77 X10(3)/MCL (ref 2.1–9.2)
NEUTROPHILS NFR BLD AUTO: 74.7 %
NEUTROPHILS NFR BLD AUTO: 91.2 %
NRBC BLD AUTO-RTO: 0 %
NRBC BLD AUTO-RTO: 0 %
PLATELET # BLD AUTO: 168 X10(3)/MCL (ref 130–400)
PLATELET # BLD AUTO: 186 X10(3)/MCL (ref 130–400)
PLATELETS.RETICULATED NFR BLD AUTO: 12.1 % (ref 0.9–11.2)
PMV BLD AUTO: 12.4 FL (ref 7.4–10.4)
PMV BLD AUTO: 12.4 FL (ref 7.4–10.4)
POTASSIUM SERPL-SCNC: 3.9 MMOL/L (ref 3.5–5.1)
POTASSIUM SERPL-SCNC: 4 MMOL/L (ref 3.5–5.1)
PROT SERPL-MCNC: 5.8 GM/DL (ref 6.4–8.3)
PROT SERPL-MCNC: 6.2 GM/DL (ref 6.4–8.3)
RBC # BLD AUTO: 3.86 X10(6)/MCL (ref 4.2–5.4)
RBC # BLD AUTO: 4.15 X10(6)/MCL (ref 4.2–5.4)
SODIUM SERPL-SCNC: 138 MMOL/L (ref 136–145)
SODIUM SERPL-SCNC: 138 MMOL/L (ref 136–145)
SPECIMEN OUTDATE: NORMAL
T PALLIDUM AB SER QL: NONREACTIVE
URATE SERPL-MCNC: 4.4 MG/DL (ref 2.6–6)
WBC # BLD AUTO: 10.4 X10(3)/MCL (ref 4.5–11.5)
WBC # BLD AUTO: 14.65 X10(3)/MCL (ref 4.5–11.5)

## 2025-06-30 PROCEDURE — 63600175 PHARM REV CODE 636 W HCPCS: Performed by: ANESTHESIOLOGY

## 2025-06-30 PROCEDURE — 86780 TREPONEMA PALLIDUM: CPT | Performed by: OBSTETRICS & GYNECOLOGY

## 2025-06-30 PROCEDURE — 37000009 HC ANESTHESIA EA ADD 15 MINS: Performed by: OBSTETRICS & GYNECOLOGY

## 2025-06-30 PROCEDURE — 36415 COLL VENOUS BLD VENIPUNCTURE: CPT | Performed by: OBSTETRICS & GYNECOLOGY

## 2025-06-30 PROCEDURE — 51702 INSERT TEMP BLADDER CATH: CPT

## 2025-06-30 PROCEDURE — 71000033 HC RECOVERY, INTIAL HOUR: Performed by: OBSTETRICS & GYNECOLOGY

## 2025-06-30 PROCEDURE — 37000008 HC ANESTHESIA 1ST 15 MINUTES: Performed by: OBSTETRICS & GYNECOLOGY

## 2025-06-30 PROCEDURE — 36004725 HC OB OR TIME LEV III - EA ADD 15 MIN: Performed by: OBSTETRICS & GYNECOLOGY

## 2025-06-30 PROCEDURE — 83615 LACTATE (LD) (LDH) ENZYME: CPT | Performed by: OBSTETRICS & GYNECOLOGY

## 2025-06-30 PROCEDURE — 27201423 OPTIME MED/SURG SUP & DEVICES STERILE SUPPLY: Performed by: OBSTETRICS & GYNECOLOGY

## 2025-06-30 PROCEDURE — 0UB70ZZ EXCISION OF BILATERAL FALLOPIAN TUBES, OPEN APPROACH: ICD-10-PCS | Performed by: OBSTETRICS & GYNECOLOGY

## 2025-06-30 PROCEDURE — 11000001 HC ACUTE MED/SURG PRIVATE ROOM

## 2025-06-30 PROCEDURE — 36004724 HC OB OR TIME LEV III - 1ST 15 MIN: Performed by: OBSTETRICS & GYNECOLOGY

## 2025-06-30 PROCEDURE — 80053 COMPREHEN METABOLIC PANEL: CPT | Performed by: OBSTETRICS & GYNECOLOGY

## 2025-06-30 PROCEDURE — 63600175 PHARM REV CODE 636 W HCPCS: Mod: JZ,TB | Performed by: OBSTETRICS & GYNECOLOGY

## 2025-06-30 PROCEDURE — 25000003 PHARM REV CODE 250

## 2025-06-30 PROCEDURE — 27200918 HC ALEXIS O RING

## 2025-06-30 PROCEDURE — 27000492 HC SLEEVE, SCD T/L

## 2025-06-30 PROCEDURE — 62322 NJX INTERLAMINAR LMBR/SAC: CPT | Performed by: ANESTHESIOLOGY

## 2025-06-30 PROCEDURE — 63600175 PHARM REV CODE 636 W HCPCS: Performed by: OBSTETRICS & GYNECOLOGY

## 2025-06-30 PROCEDURE — 85025 COMPLETE CBC W/AUTO DIFF WBC: CPT | Performed by: OBSTETRICS & GYNECOLOGY

## 2025-06-30 PROCEDURE — 84550 ASSAY OF BLOOD/URIC ACID: CPT | Performed by: OBSTETRICS & GYNECOLOGY

## 2025-06-30 PROCEDURE — 25000003 PHARM REV CODE 250: Performed by: OBSTETRICS & GYNECOLOGY

## 2025-06-30 PROCEDURE — 63600175 PHARM REV CODE 636 W HCPCS

## 2025-06-30 PROCEDURE — 86901 BLOOD TYPING SEROLOGIC RH(D): CPT | Performed by: OBSTETRICS & GYNECOLOGY

## 2025-06-30 RX ORDER — SODIUM CITRATE AND CITRIC ACID MONOHYDRATE 334; 500 MG/5ML; MG/5ML
30 SOLUTION ORAL ONCE
Status: CANCELLED | OUTPATIENT
Start: 2025-06-30 | End: 2025-06-30

## 2025-06-30 RX ORDER — METHYLERGONOVINE MALEATE 0.2 MG/ML
200 INJECTION INTRAVENOUS ONCE AS NEEDED
Status: DISCONTINUED | OUTPATIENT
Start: 2025-06-30 | End: 2025-07-03 | Stop reason: HOSPADM

## 2025-06-30 RX ORDER — FENTANYL CITRATE 50 UG/ML
INJECTION, SOLUTION INTRAMUSCULAR; INTRAVENOUS
Status: DISCONTINUED | OUTPATIENT
Start: 2025-06-30 | End: 2025-06-30

## 2025-06-30 RX ORDER — MUPIROCIN 20 MG/G
OINTMENT TOPICAL 2 TIMES DAILY
Status: DISCONTINUED | OUTPATIENT
Start: 2025-06-30 | End: 2025-07-03 | Stop reason: HOSPADM

## 2025-06-30 RX ORDER — SIMETHICONE 80 MG
1 TABLET,CHEWABLE ORAL EVERY 6 HOURS PRN
Status: DISCONTINUED | OUTPATIENT
Start: 2025-06-30 | End: 2025-07-03 | Stop reason: HOSPADM

## 2025-06-30 RX ORDER — ONDANSETRON HYDROCHLORIDE 2 MG/ML
4 INJECTION, SOLUTION INTRAVENOUS DAILY PRN
Status: CANCELLED | OUTPATIENT
Start: 2025-06-30

## 2025-06-30 RX ORDER — EPHEDRINE SULFATE 50 MG/ML
INJECTION, SOLUTION INTRAVENOUS
Status: DISCONTINUED | OUTPATIENT
Start: 2025-06-30 | End: 2025-06-30

## 2025-06-30 RX ORDER — OXYTOCIN 10 [USP'U]/ML
10 INJECTION, SOLUTION INTRAMUSCULAR; INTRAVENOUS ONCE AS NEEDED
Status: DISCONTINUED | OUTPATIENT
Start: 2025-06-30 | End: 2025-07-03 | Stop reason: HOSPADM

## 2025-06-30 RX ORDER — DIPHENHYDRAMINE HYDROCHLORIDE 50 MG/ML
12.5 INJECTION, SOLUTION INTRAMUSCULAR; INTRAVENOUS
Status: DISCONTINUED | OUTPATIENT
Start: 2025-06-30 | End: 2025-07-03 | Stop reason: HOSPADM

## 2025-06-30 RX ORDER — DIPHENHYDRAMINE HCL 25 MG
25 CAPSULE ORAL EVERY 4 HOURS PRN
Status: DISCONTINUED | OUTPATIENT
Start: 2025-06-30 | End: 2025-07-03 | Stop reason: HOSPADM

## 2025-06-30 RX ORDER — LABETALOL HCL 20 MG/4 ML
20 SYRINGE (ML) INTRAVENOUS ONCE AS NEEDED
Status: COMPLETED | OUTPATIENT
Start: 2025-06-30 | End: 2025-06-30

## 2025-06-30 RX ORDER — CARBOPROST TROMETHAMINE 250 UG/ML
250 INJECTION, SOLUTION INTRAMUSCULAR
Status: DISCONTINUED | OUTPATIENT
Start: 2025-06-30 | End: 2025-07-03 | Stop reason: HOSPADM

## 2025-06-30 RX ORDER — MISOPROSTOL 100 UG/1
800 TABLET ORAL
Status: DISCONTINUED | OUTPATIENT
Start: 2025-06-30 | End: 2025-07-03 | Stop reason: HOSPADM

## 2025-06-30 RX ORDER — HYDROMORPHONE HYDROCHLORIDE 2 MG/ML
1 INJECTION, SOLUTION INTRAMUSCULAR; INTRAVENOUS; SUBCUTANEOUS EVERY 4 HOURS PRN
Refills: 0 | Status: DISCONTINUED | OUTPATIENT
Start: 2025-06-30 | End: 2025-07-03 | Stop reason: HOSPADM

## 2025-06-30 RX ORDER — CEFAZOLIN SODIUM 1 G/3ML
2 INJECTION, POWDER, FOR SOLUTION INTRAMUSCULAR; INTRAVENOUS
Status: DISCONTINUED | OUTPATIENT
Start: 2025-06-30 | End: 2025-07-03 | Stop reason: HOSPADM

## 2025-06-30 RX ORDER — ADHESIVE BANDAGE
30 BANDAGE TOPICAL 2 TIMES DAILY PRN
Status: DISCONTINUED | OUTPATIENT
Start: 2025-07-01 | End: 2025-07-03 | Stop reason: HOSPADM

## 2025-06-30 RX ORDER — LABETALOL HCL 20 MG/4 ML
40 SYRINGE (ML) INTRAVENOUS ONCE AS NEEDED
Status: DISCONTINUED | OUTPATIENT
Start: 2025-06-30 | End: 2025-07-03 | Stop reason: HOSPADM

## 2025-06-30 RX ORDER — METOCLOPRAMIDE HYDROCHLORIDE 5 MG/ML
10 INJECTION INTRAMUSCULAR; INTRAVENOUS ONCE
Status: COMPLETED | OUTPATIENT
Start: 2025-06-30 | End: 2025-06-30

## 2025-06-30 RX ORDER — SODIUM CHLORIDE 0.9 % (FLUSH) 0.9 %
10 SYRINGE (ML) INJECTION
Status: DISCONTINUED | OUTPATIENT
Start: 2025-06-30 | End: 2025-07-03 | Stop reason: HOSPADM

## 2025-06-30 RX ORDER — OXYTOCIN-SODIUM CHLORIDE 0.9% IV SOLN 30 UNIT/500ML 30-0.9/5 UT/ML-%
SOLUTION INTRAVENOUS CONTINUOUS PRN
Status: DISCONTINUED | OUTPATIENT
Start: 2025-06-30 | End: 2025-06-30

## 2025-06-30 RX ORDER — OXYTOCIN-SODIUM CHLORIDE 0.9% IV SOLN 30 UNIT/500ML 30-0.9/5 UT/ML-%
30 SOLUTION INTRAVENOUS ONCE AS NEEDED
Status: DISCONTINUED | OUTPATIENT
Start: 2025-06-30 | End: 2025-07-03 | Stop reason: HOSPADM

## 2025-06-30 RX ORDER — HYDRALAZINE HYDROCHLORIDE 20 MG/ML
5 INJECTION INTRAMUSCULAR; INTRAVENOUS ONCE AS NEEDED
Status: COMPLETED | OUTPATIENT
Start: 2025-06-30 | End: 2025-06-30

## 2025-06-30 RX ORDER — IBUPROFEN 800 MG/1
800 TABLET, FILM COATED ORAL EVERY 8 HOURS
Status: DISCONTINUED | OUTPATIENT
Start: 2025-07-01 | End: 2025-07-03 | Stop reason: HOSPADM

## 2025-06-30 RX ORDER — LABETALOL 100 MG/1
100 TABLET, FILM COATED ORAL EVERY 12 HOURS
Status: DISCONTINUED | OUTPATIENT
Start: 2025-06-30 | End: 2025-07-01

## 2025-06-30 RX ORDER — SODIUM CHLORIDE, SODIUM LACTATE, POTASSIUM CHLORIDE, CALCIUM CHLORIDE 600; 310; 30; 20 MG/100ML; MG/100ML; MG/100ML; MG/100ML
INJECTION, SOLUTION INTRAVENOUS CONTINUOUS
Status: DISCONTINUED | OUTPATIENT
Start: 2025-06-30 | End: 2025-07-03 | Stop reason: HOSPADM

## 2025-06-30 RX ORDER — OXYCODONE AND ACETAMINOPHEN 10; 325 MG/1; MG/1
1 TABLET ORAL EVERY 4 HOURS PRN
Status: DISCONTINUED | OUTPATIENT
Start: 2025-06-30 | End: 2025-07-03 | Stop reason: HOSPADM

## 2025-06-30 RX ORDER — OXYTOCIN-SODIUM CHLORIDE 0.9% IV SOLN 30 UNIT/500ML 30-0.9/5 UT/ML-%
10 SOLUTION INTRAVENOUS ONCE AS NEEDED
Status: DISCONTINUED | OUTPATIENT
Start: 2025-06-30 | End: 2025-07-03 | Stop reason: HOSPADM

## 2025-06-30 RX ORDER — BISACODYL 10 MG/1
10 SUPPOSITORY RECTAL ONCE AS NEEDED
Status: DISCONTINUED | OUTPATIENT
Start: 2025-06-30 | End: 2025-07-03 | Stop reason: HOSPADM

## 2025-06-30 RX ORDER — ONDANSETRON 4 MG/1
8 TABLET, ORALLY DISINTEGRATING ORAL EVERY 8 HOURS PRN
Status: DISCONTINUED | OUTPATIENT
Start: 2025-06-30 | End: 2025-07-03 | Stop reason: HOSPADM

## 2025-06-30 RX ORDER — DEXAMETHASONE SODIUM PHOSPHATE 4 MG/ML
INJECTION, SOLUTION INTRA-ARTICULAR; INTRALESIONAL; INTRAMUSCULAR; INTRAVENOUS; SOFT TISSUE
Status: DISCONTINUED | OUTPATIENT
Start: 2025-06-30 | End: 2025-06-30

## 2025-06-30 RX ORDER — OXYTOCIN-SODIUM CHLORIDE 0.9% IV SOLN 30 UNIT/500ML 30-0.9/5 UT/ML-%
95 SOLUTION INTRAVENOUS ONCE AS NEEDED
Status: DISCONTINUED | OUTPATIENT
Start: 2025-06-30 | End: 2025-07-03 | Stop reason: HOSPADM

## 2025-06-30 RX ORDER — ACETAMINOPHEN 10 MG/ML
INJECTION, SOLUTION INTRAVENOUS
Status: DISCONTINUED | OUTPATIENT
Start: 2025-06-30 | End: 2025-06-30

## 2025-06-30 RX ORDER — AMOXICILLIN 250 MG
1 CAPSULE ORAL NIGHTLY PRN
Status: DISCONTINUED | OUTPATIENT
Start: 2025-06-30 | End: 2025-07-03 | Stop reason: HOSPADM

## 2025-06-30 RX ORDER — METHYLERGONOVINE MALEATE 0.2 MG/ML
200 INJECTION INTRAVENOUS
Status: DISCONTINUED | OUTPATIENT
Start: 2025-06-30 | End: 2025-07-03 | Stop reason: HOSPADM

## 2025-06-30 RX ORDER — FAMOTIDINE 10 MG/ML
20 INJECTION, SOLUTION INTRAVENOUS
Status: DISCONTINUED | OUTPATIENT
Start: 2025-06-30 | End: 2025-07-03 | Stop reason: HOSPADM

## 2025-06-30 RX ORDER — BUPIVACAINE HYDROCHLORIDE 2.5 MG/ML
INJECTION, SOLUTION EPIDURAL; INFILTRATION; INTRACAUDAL; PERINEURAL
Status: COMPLETED | OUTPATIENT
Start: 2025-06-30 | End: 2025-06-30

## 2025-06-30 RX ORDER — OXYCODONE AND ACETAMINOPHEN 5; 325 MG/1; MG/1
1 TABLET ORAL EVERY 4 HOURS PRN
Status: DISCONTINUED | OUTPATIENT
Start: 2025-06-30 | End: 2025-07-03 | Stop reason: HOSPADM

## 2025-06-30 RX ORDER — PROCHLORPERAZINE EDISYLATE 5 MG/ML
5 INJECTION INTRAMUSCULAR; INTRAVENOUS EVERY 30 MIN PRN
Status: CANCELLED | OUTPATIENT
Start: 2025-06-30

## 2025-06-30 RX ORDER — HYDRALAZINE HYDROCHLORIDE 20 MG/ML
10 INJECTION INTRAMUSCULAR; INTRAVENOUS ONCE AS NEEDED
Status: DISCONTINUED | OUTPATIENT
Start: 2025-06-30 | End: 2025-06-30 | Stop reason: SDUPTHER

## 2025-06-30 RX ORDER — LABETALOL HCL 20 MG/4 ML
80 SYRINGE (ML) INTRAVENOUS ONCE AS NEEDED
Status: DISCONTINUED | OUTPATIENT
Start: 2025-06-30 | End: 2025-06-30 | Stop reason: SDUPTHER

## 2025-06-30 RX ORDER — MISOPROSTOL 100 UG/1
800 TABLET ORAL ONCE AS NEEDED
Status: DISCONTINUED | OUTPATIENT
Start: 2025-06-30 | End: 2025-07-03 | Stop reason: HOSPADM

## 2025-06-30 RX ORDER — NALOXONE HCL 0.4 MG/ML
0.04 VIAL (ML) INJECTION
Status: DISCONTINUED | OUTPATIENT
Start: 2025-06-30 | End: 2025-07-03 | Stop reason: HOSPADM

## 2025-06-30 RX ORDER — METOCLOPRAMIDE HYDROCHLORIDE 5 MG/ML
10 INJECTION INTRAMUSCULAR; INTRAVENOUS ONCE
Status: CANCELLED | OUTPATIENT
Start: 2025-06-30 | End: 2025-06-30

## 2025-06-30 RX ORDER — MORPHINE SULFATE 0.5 MG/ML
INJECTION, SOLUTION EPIDURAL; INTRATHECAL; INTRAVENOUS
Status: DISCONTINUED | OUTPATIENT
Start: 2025-06-30 | End: 2025-06-30

## 2025-06-30 RX ORDER — KETOROLAC TROMETHAMINE 30 MG/ML
30 INJECTION, SOLUTION INTRAMUSCULAR; INTRAVENOUS EVERY 8 HOURS
Status: DISCONTINUED | OUTPATIENT
Start: 2025-06-30 | End: 2025-06-30

## 2025-06-30 RX ORDER — HYDROMORPHONE HYDROCHLORIDE 2 MG/ML
0.4 INJECTION, SOLUTION INTRAMUSCULAR; INTRAVENOUS; SUBCUTANEOUS EVERY 5 MIN PRN
Refills: 0 | Status: CANCELLED | OUTPATIENT
Start: 2025-06-30

## 2025-06-30 RX ORDER — LABETALOL HCL 20 MG/4 ML
80 SYRINGE (ML) INTRAVENOUS ONCE AS NEEDED
Status: DISCONTINUED | OUTPATIENT
Start: 2025-06-30 | End: 2025-07-03 | Stop reason: HOSPADM

## 2025-06-30 RX ORDER — HYDRALAZINE HYDROCHLORIDE 20 MG/ML
10 INJECTION INTRAMUSCULAR; INTRAVENOUS ONCE AS NEEDED
Status: DISCONTINUED | OUTPATIENT
Start: 2025-06-30 | End: 2025-06-30

## 2025-06-30 RX ORDER — DOCUSATE SODIUM 100 MG/1
200 CAPSULE, LIQUID FILLED ORAL 2 TIMES DAILY
Status: DISCONTINUED | OUTPATIENT
Start: 2025-06-30 | End: 2025-07-03 | Stop reason: HOSPADM

## 2025-06-30 RX ORDER — IPRATROPIUM BROMIDE AND ALBUTEROL SULFATE 2.5; .5 MG/3ML; MG/3ML
3 SOLUTION RESPIRATORY (INHALATION)
Status: CANCELLED | OUTPATIENT
Start: 2025-06-30

## 2025-06-30 RX ORDER — LABETALOL HCL 20 MG/4 ML
80 SYRINGE (ML) INTRAVENOUS ONCE AS NEEDED
Status: DISCONTINUED | OUTPATIENT
Start: 2025-06-30 | End: 2025-06-30

## 2025-06-30 RX ORDER — KETOROLAC TROMETHAMINE 30 MG/ML
INJECTION, SOLUTION INTRAMUSCULAR; INTRAVENOUS
Status: DISCONTINUED | OUTPATIENT
Start: 2025-06-30 | End: 2025-06-30

## 2025-06-30 RX ORDER — SODIUM CITRATE AND CITRIC ACID MONOHYDRATE 334; 500 MG/5ML; MG/5ML
30 SOLUTION ORAL
Status: DISCONTINUED | OUTPATIENT
Start: 2025-06-30 | End: 2025-07-03 | Stop reason: HOSPADM

## 2025-06-30 RX ORDER — LABETALOL HCL 20 MG/4 ML
20 SYRINGE (ML) INTRAVENOUS ONCE AS NEEDED
Status: DISCONTINUED | OUTPATIENT
Start: 2025-06-30 | End: 2025-07-03 | Stop reason: HOSPADM

## 2025-06-30 RX ORDER — GLUCAGON 1 MG
1 KIT INJECTION
Status: CANCELLED | OUTPATIENT
Start: 2025-06-30

## 2025-06-30 RX ORDER — PRENATAL WITH FERROUS FUM AND FOLIC ACID 3080; 920; 120; 400; 22; 1.84; 3; 20; 10; 1; 12; 200; 27; 25; 2 [IU]/1; [IU]/1; MG/1; [IU]/1; MG/1; MG/1; MG/1; MG/1; MG/1; MG/1; UG/1; MG/1; MG/1; MG/1; MG/1
1 TABLET ORAL DAILY
Status: DISCONTINUED | OUTPATIENT
Start: 2025-06-30 | End: 2025-06-30

## 2025-06-30 RX ORDER — DOCUSATE SODIUM 100 MG/1
200 CAPSULE, LIQUID FILLED ORAL 2 TIMES DAILY
Status: DISCONTINUED | OUTPATIENT
Start: 2025-06-30 | End: 2025-06-30

## 2025-06-30 RX ORDER — HYDRALAZINE HYDROCHLORIDE 20 MG/ML
10 INJECTION INTRAMUSCULAR; INTRAVENOUS ONCE AS NEEDED
Status: DISCONTINUED | OUTPATIENT
Start: 2025-06-30 | End: 2025-07-03 | Stop reason: HOSPADM

## 2025-06-30 RX ORDER — LABETALOL HCL 20 MG/4 ML
40 SYRINGE (ML) INTRAVENOUS ONCE AS NEEDED
Status: DISCONTINUED | OUTPATIENT
Start: 2025-06-30 | End: 2025-06-30

## 2025-06-30 RX ORDER — DIPHENOXYLATE HYDROCHLORIDE AND ATROPINE SULFATE 2.5; .025 MG/1; MG/1
2 TABLET ORAL EVERY 6 HOURS PRN
Status: DISCONTINUED | OUTPATIENT
Start: 2025-06-30 | End: 2025-07-03 | Stop reason: HOSPADM

## 2025-06-30 RX ORDER — NALOXONE HCL 0.4 MG/ML
0.04 VIAL (ML) INJECTION EVERY 5 MIN PRN
Status: DISCONTINUED | OUTPATIENT
Start: 2025-06-30 | End: 2025-07-03 | Stop reason: HOSPADM

## 2025-06-30 RX ORDER — PROCHLORPERAZINE EDISYLATE 5 MG/ML
5 INJECTION INTRAMUSCULAR; INTRAVENOUS EVERY 6 HOURS PRN
Status: DISCONTINUED | OUTPATIENT
Start: 2025-06-30 | End: 2025-07-03 | Stop reason: HOSPADM

## 2025-06-30 RX ORDER — MUPIROCIN 20 MG/G
OINTMENT TOPICAL
Status: DISCONTINUED | OUTPATIENT
Start: 2025-06-30 | End: 2025-07-03 | Stop reason: HOSPADM

## 2025-06-30 RX ORDER — PRENATAL WITH FERROUS FUM AND FOLIC ACID 3080; 920; 120; 400; 22; 1.84; 3; 20; 10; 1; 12; 200; 27; 25; 2 [IU]/1; [IU]/1; MG/1; [IU]/1; MG/1; MG/1; MG/1; MG/1; MG/1; MG/1; UG/1; MG/1; MG/1; MG/1; MG/1
1 TABLET ORAL DAILY
Status: DISCONTINUED | OUTPATIENT
Start: 2025-07-01 | End: 2025-07-03 | Stop reason: HOSPADM

## 2025-06-30 RX ORDER — LABETALOL HCL 20 MG/4 ML
20 SYRINGE (ML) INTRAVENOUS ONCE AS NEEDED
Status: DISCONTINUED | OUTPATIENT
Start: 2025-06-30 | End: 2025-06-30

## 2025-06-30 RX ORDER — OXYTOCIN-SODIUM CHLORIDE 0.9% IV SOLN 30 UNIT/500ML 30-0.9/5 UT/ML-%
95 SOLUTION INTRAVENOUS CONTINUOUS PRN
Status: DISCONTINUED | OUTPATIENT
Start: 2025-06-30 | End: 2025-07-03 | Stop reason: HOSPADM

## 2025-06-30 RX ORDER — KETOROLAC TROMETHAMINE 30 MG/ML
30 INJECTION, SOLUTION INTRAMUSCULAR; INTRAVENOUS EVERY 8 HOURS
Status: COMPLETED | OUTPATIENT
Start: 2025-06-30 | End: 2025-07-01

## 2025-06-30 RX ORDER — ONDANSETRON HYDROCHLORIDE 2 MG/ML
4 INJECTION, SOLUTION INTRAVENOUS EVERY 12 HOURS PRN
Status: DISCONTINUED | OUTPATIENT
Start: 2025-06-30 | End: 2025-07-03 | Stop reason: HOSPADM

## 2025-06-30 RX ORDER — IBUPROFEN 800 MG/1
800 TABLET, FILM COATED ORAL EVERY 8 HOURS
Status: DISCONTINUED | OUTPATIENT
Start: 2025-07-01 | End: 2025-06-30

## 2025-06-30 RX ORDER — LIDOCAINE HYDROCHLORIDE 10 MG/ML
1 INJECTION, SOLUTION EPIDURAL; INFILTRATION; INTRACAUDAL; PERINEURAL ONCE
Status: CANCELLED | OUTPATIENT
Start: 2025-06-30 | End: 2025-06-30

## 2025-06-30 RX ORDER — HYDROMORPHONE HYDROCHLORIDE 2 MG/ML
0.2 INJECTION, SOLUTION INTRAMUSCULAR; INTRAVENOUS; SUBCUTANEOUS EVERY 5 MIN PRN
Refills: 0 | Status: CANCELLED | OUTPATIENT
Start: 2025-06-30

## 2025-06-30 RX ORDER — ONDANSETRON HYDROCHLORIDE 2 MG/ML
INJECTION, SOLUTION INTRAVENOUS
Status: DISCONTINUED | OUTPATIENT
Start: 2025-06-30 | End: 2025-06-30

## 2025-06-30 RX ORDER — LABETALOL HCL 20 MG/4 ML
40 SYRINGE (ML) INTRAVENOUS ONCE AS NEEDED
Status: DISCONTINUED | OUTPATIENT
Start: 2025-06-30 | End: 2025-06-30 | Stop reason: SDUPTHER

## 2025-06-30 RX ADMIN — DIPHENHYDRAMINE HYDROCHLORIDE 12.5 MG: 50 INJECTION INTRAMUSCULAR; INTRAVENOUS at 08:06

## 2025-06-30 RX ADMIN — SODIUM CHLORIDE, POTASSIUM CHLORIDE, SODIUM LACTATE AND CALCIUM CHLORIDE 1000 ML: 600; 310; 30; 20 INJECTION, SOLUTION INTRAVENOUS at 08:06

## 2025-06-30 RX ADMIN — ONDANSETRON 8 MG: 2 INJECTION INTRAMUSCULAR; INTRAVENOUS at 10:06

## 2025-06-30 RX ADMIN — MORPHINE SULFATE 0.12 MG: 0.5 INJECTION EPIDURAL; INTRATHECAL; INTRAVENOUS at 11:06

## 2025-06-30 RX ADMIN — METOCLOPRAMIDE 10 MG: 5 INJECTION, SOLUTION INTRAMUSCULAR; INTRAVENOUS at 09:06

## 2025-06-30 RX ADMIN — ACETAMINOPHEN 1000 MG: 10 INJECTION, SOLUTION INTRAVENOUS at 11:06

## 2025-06-30 RX ADMIN — DEXAMETHASONE SODIUM PHOSPHATE 8 MG: 4 INJECTION, SOLUTION INTRA-ARTICULAR; INTRALESIONAL; INTRAMUSCULAR; INTRAVENOUS; SOFT TISSUE at 10:06

## 2025-06-30 RX ADMIN — BUPIVACAINE HYDROCHLORIDE 1.8 ML: 2.5 INJECTION, SOLUTION EPIDURAL; INFILTRATION; INTRACAUDAL; PERINEURAL at 11:06

## 2025-06-30 RX ADMIN — EPHEDRINE SULFATE 25 MG: 50 INJECTION INTRAVENOUS at 11:06

## 2025-06-30 RX ADMIN — KETOROLAC TROMETHAMINE 30 MG: 30 INJECTION, SOLUTION INTRAMUSCULAR; INTRAVENOUS at 08:06

## 2025-06-30 RX ADMIN — KETOROLAC TROMETHAMINE 30 MG: 30 INJECTION, SOLUTION INTRAMUSCULAR; INTRAVENOUS at 11:06

## 2025-06-30 RX ADMIN — Medication 500 ML/HR: at 11:06

## 2025-06-30 RX ADMIN — DEXTROSE MONOHYDRATE 2 G: 50 INJECTION, SOLUTION INTRAVENOUS at 11:06

## 2025-06-30 RX ADMIN — SODIUM CITRATE AND CITRIC ACID MONOHYDRATE 30 ML: 500; 334 SOLUTION ORAL at 09:06

## 2025-06-30 RX ADMIN — LABETALOL HYDROCHLORIDE 100 MG: 100 TABLET, FILM COATED ORAL at 04:06

## 2025-06-30 RX ADMIN — SODIUM CHLORIDE, POTASSIUM CHLORIDE, SODIUM LACTATE AND CALCIUM CHLORIDE: 600; 310; 30; 20 INJECTION, SOLUTION INTRAVENOUS at 10:06

## 2025-06-30 RX ADMIN — LABETALOL HYDROCHLORIDE 20 MG: 5 INJECTION, SOLUTION INTRAVENOUS at 09:06

## 2025-06-30 RX ADMIN — FENTANYL CITRATE 10 MCG: 50 INJECTION, SOLUTION INTRAMUSCULAR; INTRAVENOUS at 11:06

## 2025-06-30 RX ADMIN — HYDRALAZINE HYDROCHLORIDE 5 MG: 20 INJECTION INTRAMUSCULAR; INTRAVENOUS at 04:06

## 2025-06-30 NOTE — ANESTHESIA PROCEDURE NOTES
Spinal    Diagnosis: C/S for IUP  Patient location during procedure: OB  Start time: 6/30/2025 11:00 AM  Timeout: 6/30/2025 11:00 AM  End time: 6/30/2025 11:02 AM    Staffing  Authorizing Provider: Norm More Jr., MD  Performing Provider: Norm More Jr., MD    Staffing  Performed by: Norm More Jr., MD  Authorized by: Norm More Jr., MD    Preanesthetic Checklist  Completed: patient identified, IV checked, site marked, risks and benefits discussed, surgical consent, monitors and equipment checked, pre-op evaluation and timeout performed  Spinal Block  Patient position: sitting  Prep: ChloraPrep  Patient monitoring: heart rate, cardiac monitor, continuous pulse ox and frequent blood pressure checks  Approach: midline  Location: L3-4  Injection technique: single shot  CSF Fluid: clear free-flowing CSF  Needle  Needle type: pencil-tip   Needle gauge: 25 G  Needle length: 3.5 in  Additional Documentation: incremental injection, negative aspiration for heme and no paresthesia on injection  Needle localization: anatomical landmarks  Assessment  Sensory level: T8   Dermatomal levels determined by alcohol wipe  Ease of block: easy  Patient's tolerance of the procedure: comfortable throughout block and no complaints  Medications:    Medications: bupivacaine (pf) (MARCAINE) injection 0.25% - Intraspinal   1.8 mL - 6/30/2025 11:02:00 AM

## 2025-06-30 NOTE — ANESTHESIA PREPROCEDURE EVALUATION
"                                                                                                             2025  Irene Resendez is a 34 y.o., female admitted for scheduled repeat  section with tubal ligation.  Patient presents mildly hypertensive with previously negative eclampsia workup per patient    Last 3 sets of Vitals        2025     8:52 AM 2025     8:57 AM 2025     9:02 AM   Vitals - 1 value per visit   Temp   37.2 °C (98.9 °F)   Resp   18   SPO2 100 %     Weight (lb)  145    Weight (kg)  65.772    Height  5' 3" (1.6 m)    BMI (Calculated)  25.7          Lab Results   Component Value Date    WBC 10.40 2025    HGB 9.3 (L) 2025    HCT 32.2 (L) 2025    MCV 77.6 (L) 2025     2025            Chemistry        Component Value Date/Time     2025 0855    K 3.9 2025 0855     2025 0855    CO2 25 2025 0855    BUN 7.4 2025 0855    CREATININE 0.68 2025 0855    GLU 67 (L) 2025 0855        Component Value Date/Time    CALCIUM 8.4 2025 0855    ALKPHOS 163 (H) 2025 0855    AST 15 2025 0855    ALT 9 2025 0855    BILITOT 0.4 2025 0855    EGFRNONAA >60 2022 0643          Pre-op Assessment    I have reviewed the Patient Summary Reports.    I have reviewed the NPO Status.   I have reviewed the Medications.     Review of Systems  Anesthesia Hx:               Denies Personal Hx of Anesthesia complications.                    Social:  Non-Smoker       Hematology/Oncology:       -- Anemia:                                  Cardiovascular:   Functional Capacity good / => 4 METS                         OB/GYN/PEDS:      Issues with Current Pregnancy  are Hypertensive Disease   , Pregnancy-induced Hypertension               Physical Exam  General: Well nourished, Cooperative, Alert and Oriented    Airway:  Mallampati: II   Mouth Opening: Normal  TM Distance: Normal  Tongue: " Normal  Neck ROM: Normal ROM    Dental:  Intact    Chest/Lungs:  Clear to auscultation, Normal Respiratory Rate    Heart:  Rate: Normal  Rhythm: Regular Rhythm        Anesthesia Plan  Type of Anesthesia, risks & benefits discussed:    Anesthesia Type: Spinal  Intra-op Monitoring Plan: Standard ASA Monitors  Post Op Pain Control Plan: multimodal analgesia, IV/PO Opioids PRN and intrathecal opioid  Informed Consent: Informed consent signed with the Patient and all parties understand the risks and agree with anesthesia plan.  All questions answered. Patient consented to blood products? Yes  ASA Score: 3  Day of Surgery Review of History & Physical: H&P Update referred to the surgeon/provider.    Ready For Surgery From Anesthesia Perspective.     .

## 2025-06-30 NOTE — PLAN OF CARE
Problem: Postpartum ( Delivery)  Goal: Successful Parent Role Transition  Outcome: Ongoing  Goal: Hemostasis  Outcome: Ongoing  Goal: Effective Bowel Elimination  Outcome: Ongoing  Goal: Fluid and Electrolyte Balance  Outcome: Ongoing  Goal: Absence of Infection Signs and Symptoms  Outcome: Ongoing  Goal: Anesthesia/Sedation Recovery  Outcome: Ongoing  Goal: Optimal Pain Control and Function  Outcome: Ongoing  Goal: Nausea and Vomiting Relief  Outcome: Ongoing  Goal: Effective Urinary Elimination  Outcome: Ongoing  Goal: Effective Oxygenation and Ventilation  Outcome: Ongoing

## 2025-06-30 NOTE — TRANSFER OF CARE
"Anesthesia Transfer of Care Note    Patient: Irene Resendez    Procedure(s) Performed: Procedure(s) (LRB):   SECTION, WITH TUBAL LIGATION (N/A)    Patient location: Labor and Delivery    Anesthesia Type: MAC and spinal    Transport from OR: Transported from OR on room air with adequate spontaneous ventilation    Post pain: adequate analgesia    Post assessment: no apparent anesthetic complications    Post vital signs: stable    Level of consciousness: awake, alert and oriented    Nausea/Vomiting: no nausea/vomiting    Complications: none    Transfer of care protocol was followed      Last vitals: Visit Vitals  BP (!) 141/79   Pulse 68   Temp 37.2 °C (98.9 °F)   Resp 18   Ht 5' 3" (1.6 m)   Wt 65.8 kg (145 lb)   LMP 2024 (Exact Date)   SpO2 99%   Breastfeeding No   BMI 25.69 kg/m²     "

## 2025-06-30 NOTE — PLAN OF CARE
Problem: Adult Inpatient Plan of Care  Goal: Plan of Care Review  Outcome: Met  Goal: Patient-Specific Goal (Individualized)  Outcome: Met  Goal: Absence of Hospital-Acquired Illness or Injury  Outcome: Met  Goal: Optimal Comfort and Wellbeing  Outcome: Met  Goal: Readiness for Transition of Care  Outcome: Met     Problem:  Fall Injury Risk  Goal: Absence of Fall, Infant Drop and Related Injury  Outcome: Met     Problem: Infection  Goal: Absence of Infection Signs and Symptoms  Outcome: Met     Problem:  Delivery  Goal: Bleeding is Controlled  Outcome: Met  Goal: Stable Fetal Wellbeing  Outcome: Met  Goal: Absence of Infection Signs and Symptoms  Outcome: Met  Goal: Effective Oxygenation and Ventilation  Outcome: Met     Problem: Wound  Goal: Optimal Coping  Outcome: Met  Goal: Optimal Functional Ability  Outcome: Met  Goal: Absence of Infection Signs and Symptoms  Outcome: Met  Goal: Improved Oral Intake  Outcome: Met  Goal: Optimal Pain Control and Function  Outcome: Met  Goal: Skin Health and Integrity  Outcome: Met  Goal: Optimal Wound Healing  Outcome: Met

## 2025-07-01 LAB
CREAT UR-MCNC: 45.2 MG/DL (ref 45–106)
PROT UR STRIP-MCNC: 50.9 MG/DL
PSYCHE PATHOLOGY RESULT: NORMAL
URINE PROTEIN/CREATININE RATIO (OLG): 1.1

## 2025-07-01 PROCEDURE — 82570 ASSAY OF URINE CREATININE: CPT | Performed by: OBSTETRICS & GYNECOLOGY

## 2025-07-01 PROCEDURE — 25000003 PHARM REV CODE 250: Performed by: OBSTETRICS & GYNECOLOGY

## 2025-07-01 PROCEDURE — 11000001 HC ACUTE MED/SURG PRIVATE ROOM

## 2025-07-01 PROCEDURE — 63600175 PHARM REV CODE 636 W HCPCS: Mod: JZ,TB | Performed by: OBSTETRICS & GYNECOLOGY

## 2025-07-01 RX ORDER — LABETALOL 100 MG/1
100 TABLET, FILM COATED ORAL EVERY 12 HOURS
Status: DISCONTINUED | OUTPATIENT
Start: 2025-07-02 | End: 2025-07-03 | Stop reason: HOSPADM

## 2025-07-01 RX ADMIN — DOCUSATE SODIUM 200 MG: 100 CAPSULE, LIQUID FILLED ORAL at 11:07

## 2025-07-01 RX ADMIN — KETOROLAC TROMETHAMINE 30 MG: 30 INJECTION, SOLUTION INTRAMUSCULAR; INTRAVENOUS at 04:07

## 2025-07-01 RX ADMIN — DOCUSATE SODIUM 200 MG: 100 CAPSULE, LIQUID FILLED ORAL at 08:07

## 2025-07-01 RX ADMIN — IBUPROFEN 800 MG: 800 TABLET, FILM COATED ORAL at 11:07

## 2025-07-01 RX ADMIN — OXYCODONE AND ACETAMINOPHEN 1 TABLET: 325; 10 TABLET ORAL at 10:07

## 2025-07-01 RX ADMIN — LABETALOL HYDROCHLORIDE 100 MG: 100 TABLET, FILM COATED ORAL at 04:07

## 2025-07-01 RX ADMIN — IBUPROFEN 800 MG: 800 TABLET, FILM COATED ORAL at 10:07

## 2025-07-01 RX ADMIN — LABETALOL HYDROCHLORIDE 100 MG: 100 TABLET, FILM COATED ORAL at 05:07

## 2025-07-01 RX ADMIN — OXYCODONE HYDROCHLORIDE AND ACETAMINOPHEN 1 TABLET: 5; 325 TABLET ORAL at 06:07

## 2025-07-01 NOTE — PLAN OF CARE
Problem: Postpartum ( Delivery)  Goal: Successful Parent Role Transition  Outcome: Progressing  Goal: Hemostasis  Outcome: Progressing  Goal: Effective Bowel Elimination  Outcome: Progressing  Goal: Fluid and Electrolyte Balance  Outcome: Progressing  Goal: Absence of Infection Signs and Symptoms  Outcome: Progressing  Goal: Anesthesia/Sedation Recovery  Outcome: Progressing  Goal: Optimal Pain Control and Function  Outcome: Progressing  Goal: Nausea and Vomiting Relief  Outcome: Progressing  Goal: Effective Urinary Elimination  Outcome: Progressing  Goal: Effective Oxygenation and Ventilation  Outcome: Progressing     Problem: Breastfeeding  Goal: Effective Breastfeeding  Outcome: Progressing

## 2025-07-01 NOTE — PLAN OF CARE
Problem: Postpartum ( Delivery)  Goal: Successful Parent Role Transition  Outcome: Ongoing  Goal: Hemostasis  Outcome: Ongoing  Goal: Effective Bowel Elimination  Outcome: Ongoing  Goal: Fluid and Electrolyte Balance  Outcome: Ongoing  Goal: Absence of Infection Signs and Symptoms  Outcome: Ongoing  Goal: Anesthesia/Sedation Recovery  Outcome: Ongoing  Goal: Optimal Pain Control and Function  Outcome: Ongoing  Goal: Nausea and Vomiting Relief  Outcome: Ongoing  Goal: Effective Urinary Elimination  Outcome: Ongoing  Goal: Effective Oxygenation and Ventilation  Outcome: Ongoing     Problem: Breastfeeding  Goal: Effective Breastfeeding  Outcome: Ongoing

## 2025-07-01 NOTE — PLAN OF CARE
Problem: Breastfeeding  Goal: Effective Breastfeeding  Outcome: Progressing  Intervention: Promote Breast Care and Comfort  Flowsheets (Taken 6/30/2025 2300)  Breast Care: Breastfeeding: open to air  Intervention: Promote Effective Breastfeeding  Flowsheets (Taken 6/30/2025 2300)  Breastfeeding Assistance:   assisted with positioning   feeding cue recognition promoted   infant latch-on verified   feeding on demand promoted   feeding session observed   infant suck/swallow verified   support offered  Parent-Child Attachment Promotion:   cue recognition promoted   positive reinforcement provided   skin-to-skin contact encouraged  Intervention: Support Exclusive Breastfeeding Success  Flowsheets (Taken 6/30/2025 2300)  Supportive Measures:   active listening utilized   counseling provided   positive reinforcement provided  Breastfeeding Support:   encouragement provided   lactation counseling provided

## 2025-07-01 NOTE — PLAN OF CARE
"  Problem: Adult Inpatient Plan of Care  Goal: Plan of Care Review  Outcome: Progressing  Goal: Patient-Specific Goal (Individualized)  Outcome: Progressing  Flowsheets (Taken 2025)  Patient/Family-Specific Goals (Include Timeframe): "pain management"  Goal: Absence of Hospital-Acquired Illness or Injury  Outcome: Progressing  Goal: Optimal Comfort and Wellbeing  Outcome: Progressing  Goal: Readiness for Transition of Care  Outcome: Progressing     Problem: Postpartum ( Delivery)  Goal: Successful Parent Role Transition  2025 by Farheen Ching RN  Outcome: Progressing  2025 by Farheen Ching RN  Outcome: Progressing  Goal: Hemostasis  2025 by Farheen Ching RN  Outcome: Progressing  2025 by Farheen Ching RN  Outcome: Progressing  Goal: Effective Bowel Elimination  2025 by Farheen Ching RN  Outcome: Progressing  2025 by Farheen Ching RN  Outcome: Progressing  Goal: Fluid and Electrolyte Balance  2025 by Farheen Ching RN  Outcome: Progressing  2025 by Farheen Ching RN  Outcome: Progressing  Goal: Absence of Infection Signs and Symptoms  2025 by Farheen Ching RN  Outcome: Progressing  2025 by Farheen Ching RN  Outcome: Progressing  Goal: Anesthesia/Sedation Recovery  2025 by Farheen Ching RN  Outcome: Progressing  2025 by Farheen Ching RN  Outcome: Progressing  Goal: Optimal Pain Control and Function  2025 by Farheen Ching RN  Outcome: Progressing  2025 by Farheen Ching RN  Outcome: Progressing  Goal: Nausea and Vomiting Relief  2025 by Farheen Ching RN  Outcome: Progressing  2025 by Farheen Ching RN  Outcome: Progressing  Goal: Effective Urinary Elimination  2025 by Farheen Ching RN  Outcome: Progressing  2025 by Farheen Ching RN  Outcome: Progressing  Goal: Effective Oxygenation and Ventilation  2025 " by Farheen Ching RN  Outcome: Progressing  7/1/2025 1856 by Farheen Ching RN  Outcome: Progressing     Problem: Breastfeeding  Goal: Effective Breastfeeding  7/1/2025 1857 by Farheen Ching RN  Outcome: Progressing  7/1/2025 1856 by Farheen Ching RN  Outcome: Progressing

## 2025-07-01 NOTE — OP NOTE
[facility]                       [address]                      [place] [address]    PATIENT NAME:      ELVIN DIAZ  YOB: 1990  CSN:               [id number]  MRN:               [id number]  ADMIT DATE:        2025 08:33:00  PHYSICIAN:         Vasquez Salguero MD                          OPERATIVE REPORT      DATE OF SURGERY:    2025 11:03:41    SURGEON:  Vasquez Salguero MD    ASSISTANT:  Dr. Amandeep Salguero.    OPERATION PERFORMED:  Repeat low transverse  section, bilateral tubal   ligation.    PREOPERATIVE DIAGNOSES:  The patient is a 34-year-old white female at term   gestation with a previous  section, undesired fertility, and gestational   hypertension.    POSTOPERATIVE DIAGNOSES:  The patient is a 34-year-old white female at term   gestation with a previous  section, undesired fertility, and gestational   hypertension.    PROCEDURE IN DETAIL:  After proper consents were obtained, the patient was   brought to the operating room, placed in supine position, prepped and draped in   a sterile fashion.  Pfannenstiel skin incision was made in the usual fashion.    The abdomen was opened up in layers.  Peritoneum was entered atraumatically.    Bladder flap was created with Metzenbaum scissors.  Low transverse uterine   incision made without difficulty and the fluid was noted to be clear.  Infant   was delivered in a vertex presentation.  The cord was double clamped, cut, and   infant was handed to the nurses.  Placenta was removed manually and the uterus   was exteriorized onto the abdominal surface.  Inside of the uterus was cleaned   with moist laps.  The lower uterine segment was then closed with #1 chromic   running-locking stitch.  Hemostasis noted to be achieved.  At this point in   time, the right fallopian tube was grasped with a Jojo.  A small hole was   created in the  mesosalpinx.  2 ligatures of 2-0 plain were placed across the   fallopian tube of approximately 3 cm apart.  The mid segment was cut and removed   from the field.  Alike procedure was done on the opposite fallopian tube.    Uterus placed back into the abdominal cavity and both gutters were cleaned with   moist laps.  The lower uterine segment was then inspected one more time and was   noted to be absolutely dry.  The needle and lap counts were correct x2.    Peritoneum was closed with 2-0 chromic simple stitch.  Fascia was closed with #1   Vicryl simple running stitch.  The subcutaneous tissue was closed with 2-0   plain in the interrupted stitch fashion.  The skin was closed with skin clips.    The patient tolerated procedure well, was brought to the recovery room in stable   fashion.        ______________________________  Vasquez Salguero MD CEP/SIMRAN  DD:  06/30/2025  Time:  05:51PM  DT:  07/01/2025  Time:  12:44AM  Job #:  [id number]      OPERATIVE REPORT

## 2025-07-01 NOTE — ANESTHESIA POSTPROCEDURE EVALUATION
Anesthesia Post Evaluation    Patient: Irene Resendez    Procedure(s) Performed: Procedure(s) (LRB):   SECTION, WITH TUBAL LIGATION (N/A)    Final Anesthesia Type: spinal      Patient location during evaluation: floor  Patient participation: Yes- Able to Participate  Level of consciousness: awake and alert  Post-procedure vital signs: reviewed and stable  Pain management: adequate  Airway patency: patent    PONV status at discharge: vomiting (controlled) and nausea (controlled)  Anesthetic complications: no      Cardiovascular status: hemodynamically stable  Respiratory status: spontaneous ventilation  Hydration status: euvolemic  Follow-up not needed.  Comments:   Post op visit on Mother Baby following JESUSITA or SAB for Obstetrical Anesthesia/Analgesia.  Block resolved.Pt ambulating. No complaints of headache.              Vitals Value Taken Time   /78 25 07:43   Temp 36.7 °C (98.1 °F) 25 07:43   Pulse 80 25 07:43   Resp 18 25 07:43   SpO2 97 % 25 07:43         Event Time   Out of Recovery 13:10:00         Pain/Adonis Score: Pain Rating Prior to Med Admin: 0 (2025  4:38 AM)  Pain Rating Post Med Admin: 0 (2025  9:00 PM)  Adonis Score: 10 (2025  1:10 PM)

## 2025-07-02 ENCOUNTER — ANESTHESIA (OUTPATIENT)
Dept: OBSTETRICS AND GYNECOLOGY | Facility: HOSPITAL | Age: 35
End: 2025-07-02
Payer: COMMERCIAL

## 2025-07-02 PROCEDURE — 25000003 PHARM REV CODE 250: Performed by: OBSTETRICS & GYNECOLOGY

## 2025-07-02 PROCEDURE — 11000001 HC ACUTE MED/SURG PRIVATE ROOM

## 2025-07-02 RX ADMIN — LABETALOL HYDROCHLORIDE 100 MG: 100 TABLET, FILM COATED ORAL at 03:07

## 2025-07-02 RX ADMIN — OXYCODONE HYDROCHLORIDE AND ACETAMINOPHEN 1 TABLET: 5; 325 TABLET ORAL at 04:07

## 2025-07-02 RX ADMIN — IBUPROFEN 800 MG: 800 TABLET, FILM COATED ORAL at 03:07

## 2025-07-02 RX ADMIN — IBUPROFEN 800 MG: 800 TABLET, FILM COATED ORAL at 05:07

## 2025-07-02 RX ADMIN — LABETALOL HYDROCHLORIDE 100 MG: 100 TABLET, FILM COATED ORAL at 05:07

## 2025-07-02 RX ADMIN — OXYCODONE HYDROCHLORIDE AND ACETAMINOPHEN 1 TABLET: 5; 325 TABLET ORAL at 05:07

## 2025-07-02 RX ADMIN — OXYCODONE HYDROCHLORIDE AND ACETAMINOPHEN 1 TABLET: 5; 325 TABLET ORAL at 09:07

## 2025-07-02 RX ADMIN — OXYCODONE HYDROCHLORIDE AND ACETAMINOPHEN 1 TABLET: 5; 325 TABLET ORAL at 01:07

## 2025-07-02 NOTE — PLAN OF CARE
Problem: Postpartum ( Delivery)  Goal: Successful Parent Role Transition  Outcome: Progressing  Goal: Hemostasis  Outcome: Progressing  Goal: Effective Bowel Elimination  Outcome: Progressing  Goal: Fluid and Electrolyte Balance  Outcome: Progressing  Goal: Absence of Infection Signs and Symptoms  Outcome: Progressing  Goal: Anesthesia/Sedation Recovery  Outcome: Progressing  Goal: Optimal Pain Control and Function  Outcome: Progressing  Goal: Nausea and Vomiting Relief  Outcome: Progressing  Goal: Effective Urinary Elimination  Outcome: Progressing  Goal: Effective Oxygenation and Ventilation  Outcome: Progressing     Problem: Breastfeeding  Goal: Effective Breastfeeding  Outcome: Progressing     Problem: Adult Inpatient Plan of Care  Goal: Plan of Care Review  Outcome: Progressing  Goal: Patient-Specific Goal (Individualized)  Outcome: Progressing  Goal: Absence of Hospital-Acquired Illness or Injury  Outcome: Progressing  Goal: Optimal Comfort and Wellbeing  Outcome: Progressing  Goal: Readiness for Transition of Care  Outcome: Progressing

## 2025-07-03 VITALS
TEMPERATURE: 98 F | RESPIRATION RATE: 14 BRPM | WEIGHT: 145 LBS | OXYGEN SATURATION: 95 % | BODY MASS INDEX: 25.69 KG/M2 | SYSTOLIC BLOOD PRESSURE: 137 MMHG | DIASTOLIC BLOOD PRESSURE: 92 MMHG | HEIGHT: 63 IN | HEART RATE: 106 BPM

## 2025-07-03 PROBLEM — Z98.891 PREVIOUS CESAREAN SECTION: Status: ACTIVE | Noted: 2025-07-03

## 2025-07-03 PROCEDURE — 25000003 PHARM REV CODE 250: Performed by: OBSTETRICS & GYNECOLOGY

## 2025-07-03 RX ADMIN — OXYCODONE HYDROCHLORIDE AND ACETAMINOPHEN 1 TABLET: 5; 325 TABLET ORAL at 08:07

## 2025-07-03 RX ADMIN — PRENATAL VITAMINS-IRON FUMARATE 27 MG IRON-FOLIC ACID 0.8 MG TABLET 1 TABLET: at 08:07

## 2025-07-03 RX ADMIN — IBUPROFEN 800 MG: 800 TABLET, FILM COATED ORAL at 03:07

## 2025-07-03 RX ADMIN — DOCUSATE SODIUM 200 MG: 100 CAPSULE, LIQUID FILLED ORAL at 08:07

## 2025-07-03 RX ADMIN — LABETALOL HYDROCHLORIDE 100 MG: 100 TABLET, FILM COATED ORAL at 06:07

## 2025-07-03 RX ADMIN — IBUPROFEN 800 MG: 800 TABLET, FILM COATED ORAL at 12:07

## 2025-07-03 RX ADMIN — OXYCODONE HYDROCHLORIDE AND ACETAMINOPHEN 1 TABLET: 5; 325 TABLET ORAL at 12:07

## 2025-07-03 NOTE — DISCHARGE SUMMARY
[facility] - 2nd Floor Mother/Baby Unit  Discharge Note  Short Stay    Procedure(s) (LRB):   SECTION, WITH TUBAL LIGATION (N/A)      OUTCOME: Patient tolerated treatment/procedure well without complication and is now ready for discharge.    DISPOSITION: Home or Self Care    FINAL DIAGNOSIS:  <principal problem not specified>    FOLLOWUP: In clinic    DISCHARGE INSTRUCTIONS:  No discharge procedures on file.      Clinical Reference Documents Added to Patient Instructions         Document     ( DELIVERY) DISCHARGE INSTRUCTIONS (ENGLISH)    POSTPARTUM DEPRESSION DISCHARGE INSTRUCTIONS (ENGLISH)    PREECLAMPSIA DISCHARGE INSTRUCTIONS (ENGLISH)            TIME SPENT ON DISCHARGE: 20 minutes  No complaints, VSSAF, incision clean and dry

## 2025-07-04 ENCOUNTER — HOSPITAL ENCOUNTER (INPATIENT)
Facility: HOSPITAL | Age: 35
LOS: 1 days | Discharge: HOME | DRG: 776 | End: 2025-07-07
Attending: OBSTETRICS & GYNECOLOGY | Admitting: OBSTETRICS & GYNECOLOGY
Payer: COMMERCIAL

## 2025-07-04 DIAGNOSIS — O14.90 PREECLAMPSIA: ICD-10-CM

## 2025-07-04 LAB
ALBUMIN SERPL-MCNC: 2.1 G/DL (ref 3.5–5)
ALBUMIN/GLOB SERPL: 0.6 RATIO (ref 1.1–2)
ALP SERPL-CCNC: 116 UNIT/L (ref 40–150)
ALT SERPL-CCNC: 15 UNIT/L (ref 0–55)
ANION GAP SERPL CALC-SCNC: 10 MEQ/L
AST SERPL-CCNC: 16 UNIT/L (ref 11–45)
BACTERIA #/AREA URNS AUTO: ABNORMAL /HPF
BASOPHILS # BLD AUTO: 0.04 X10(3)/MCL
BASOPHILS NFR BLD AUTO: 0.2 %
BILIRUB SERPL-MCNC: 0.5 MG/DL
BILIRUB UR QL STRIP.AUTO: NEGATIVE
BUN SERPL-MCNC: 8.2 MG/DL (ref 7–18.7)
CALCIUM SERPL-MCNC: 7.9 MG/DL (ref 8.4–10.2)
CHLORIDE SERPL-SCNC: 104 MMOL/L (ref 98–107)
CLARITY UR: CLEAR
CO2 SERPL-SCNC: 26 MMOL/L (ref 22–29)
COLOR UR AUTO: YELLOW
CREAT SERPL-MCNC: 0.7 MG/DL (ref 0.55–1.02)
CREAT UR-MCNC: 147.4 MG/DL (ref 45–106)
CREAT/UREA NIT SERPL: 12
EOSINOPHIL # BLD AUTO: 0.13 X10(3)/MCL (ref 0–0.9)
EOSINOPHIL NFR BLD AUTO: 0.8 %
ERYTHROCYTE [DISTWIDTH] IN BLOOD BY AUTOMATED COUNT: 16.5 % (ref 11.5–17)
GFR SERPLBLD CREATININE-BSD FMLA CKD-EPI: >60 ML/MIN/1.73/M2
GLOBULIN SER-MCNC: 3.5 GM/DL (ref 2.4–3.5)
GLUCOSE SERPL-MCNC: 97 MG/DL (ref 74–100)
GLUCOSE UR QL STRIP: NORMAL
GROUP & RH: NORMAL
HCT VFR BLD AUTO: 29.5 % (ref 37–47)
HGB BLD-MCNC: 8.5 G/DL (ref 12–16)
HGB UR QL STRIP: ABNORMAL
HYALINE CASTS #/AREA URNS LPF: ABNORMAL /LPF
IMM GRANULOCYTES # BLD AUTO: 0.3 X10(3)/MCL (ref 0–0.04)
IMM GRANULOCYTES NFR BLD AUTO: 1.7 %
INDIRECT COOMBS: NORMAL
KETONES UR QL STRIP: NEGATIVE
LDH SERPL-CCNC: 254 U/L (ref 125–220)
LEUKOCYTE ESTERASE UR QL STRIP: NEGATIVE
LYMPHOCYTES # BLD AUTO: 2.08 X10(3)/MCL (ref 0.6–4.6)
LYMPHOCYTES NFR BLD AUTO: 12 %
MCH RBC QN AUTO: 22.6 PG (ref 27–31)
MCHC RBC AUTO-ENTMCNC: 28.8 G/DL (ref 33–36)
MCV RBC AUTO: 78.5 FL (ref 80–94)
MONOCYTES # BLD AUTO: 1.15 X10(3)/MCL (ref 0.1–1.3)
MONOCYTES NFR BLD AUTO: 6.6 %
NEUTROPHILS # BLD AUTO: 13.63 X10(3)/MCL (ref 2.1–9.2)
NEUTROPHILS NFR BLD AUTO: 78.7 %
NITRITE UR QL STRIP: NEGATIVE
NRBC BLD AUTO-RTO: 0 %
PH UR STRIP: 6.5 [PH]
PLATELET # BLD AUTO: 321 X10(3)/MCL (ref 130–400)
PMV BLD AUTO: 10.6 FL (ref 7.4–10.4)
POTASSIUM SERPL-SCNC: 3.7 MMOL/L (ref 3.5–5.1)
PROT SERPL-MCNC: 5.6 GM/DL (ref 6.4–8.3)
PROT UR QL STRIP: ABNORMAL
PROT UR STRIP-MCNC: 716.1 MG/DL
RBC # BLD AUTO: 3.76 X10(6)/MCL (ref 4.2–5.4)
RBC #/AREA URNS AUTO: ABNORMAL /HPF
SODIUM SERPL-SCNC: 140 MMOL/L (ref 136–145)
SP GR UR STRIP.AUTO: 1.02 (ref 1–1.03)
SPECIMEN OUTDATE: NORMAL
SQUAMOUS #/AREA URNS LPF: ABNORMAL /HPF
TSH SERPL-ACNC: 1.49 UIU/ML (ref 0.35–4.94)
URATE SERPL-MCNC: 4 MG/DL (ref 2.6–6)
URINE PROTEIN/CREATININE RATIO (OLG): 4.9
UROBILINOGEN UR STRIP-ACNC: NORMAL
WBC # BLD AUTO: 17.33 X10(3)/MCL (ref 4.5–11.5)
WBC #/AREA URNS AUTO: ABNORMAL /HPF

## 2025-07-04 PROCEDURE — 83615 LACTATE (LD) (LDH) ENZYME: CPT | Performed by: OBSTETRICS & GYNECOLOGY

## 2025-07-04 PROCEDURE — 84443 ASSAY THYROID STIM HORMONE: CPT | Performed by: OBSTETRICS & GYNECOLOGY

## 2025-07-04 PROCEDURE — 84550 ASSAY OF BLOOD/URIC ACID: CPT | Performed by: OBSTETRICS & GYNECOLOGY

## 2025-07-04 PROCEDURE — 81001 URINALYSIS AUTO W/SCOPE: CPT | Performed by: OBSTETRICS & GYNECOLOGY

## 2025-07-04 PROCEDURE — 86901 BLOOD TYPING SEROLOGIC RH(D): CPT | Performed by: OBSTETRICS & GYNECOLOGY

## 2025-07-04 PROCEDURE — 80053 COMPREHEN METABOLIC PANEL: CPT | Performed by: OBSTETRICS & GYNECOLOGY

## 2025-07-04 PROCEDURE — 99285 EMERGENCY DEPT VISIT HI MDM: CPT | Mod: 25

## 2025-07-04 PROCEDURE — 96375 TX/PRO/DX INJ NEW DRUG ADDON: CPT

## 2025-07-04 PROCEDURE — 85025 COMPLETE CBC W/AUTO DIFF WBC: CPT | Performed by: OBSTETRICS & GYNECOLOGY

## 2025-07-04 PROCEDURE — 82570 ASSAY OF URINE CREATININE: CPT | Performed by: OBSTETRICS & GYNECOLOGY

## 2025-07-04 RX ORDER — HYDRALAZINE HYDROCHLORIDE 20 MG/ML
10 INJECTION INTRAMUSCULAR; INTRAVENOUS ONCE AS NEEDED
Status: DISCONTINUED | OUTPATIENT
Start: 2025-07-05 | End: 2025-07-08 | Stop reason: HOSPADM

## 2025-07-04 RX ORDER — LIDOCAINE HYDROCHLORIDE 10 MG/ML
1 INJECTION, SOLUTION EPIDURAL; INFILTRATION; INTRACAUDAL; PERINEURAL ONCE
Status: DISCONTINUED | OUTPATIENT
Start: 2025-07-04 | End: 2025-07-04

## 2025-07-04 RX ORDER — MAGNESIUM SULFATE HEPTAHYDRATE 40 MG/ML
4 INJECTION, SOLUTION INTRAVENOUS ONCE
Status: COMPLETED | OUTPATIENT
Start: 2025-07-04 | End: 2025-07-05

## 2025-07-04 RX ORDER — ONDANSETRON HYDROCHLORIDE 2 MG/ML
4 INJECTION, SOLUTION INTRAVENOUS DAILY PRN
Status: DISCONTINUED | OUTPATIENT
Start: 2025-07-04 | End: 2025-07-04

## 2025-07-04 RX ORDER — LABETALOL HYDROCHLORIDE 5 MG/ML
20 INJECTION, SOLUTION INTRAVENOUS ONCE AS NEEDED
Status: DISCONTINUED | OUTPATIENT
Start: 2025-07-05 | End: 2025-07-08 | Stop reason: HOSPADM

## 2025-07-04 RX ORDER — OXYCODONE AND ACETAMINOPHEN 5; 325 MG/1; MG/1
1 TABLET ORAL EVERY 4 HOURS PRN
COMMUNITY

## 2025-07-04 RX ORDER — CALCIUM GLUCONATE 98 MG/ML
1 INJECTION, SOLUTION INTRAVENOUS
Status: DISCONTINUED | OUTPATIENT
Start: 2025-07-05 | End: 2025-07-08 | Stop reason: HOSPADM

## 2025-07-04 RX ORDER — HYDROMORPHONE HYDROCHLORIDE 2 MG/ML
0.4 INJECTION, SOLUTION INTRAMUSCULAR; INTRAVENOUS; SUBCUTANEOUS EVERY 5 MIN PRN
Status: DISCONTINUED | OUTPATIENT
Start: 2025-07-04 | End: 2025-07-04

## 2025-07-04 RX ORDER — IBUPROFEN 600 MG/1
600 TABLET, FILM COATED ORAL 3 TIMES DAILY
COMMUNITY

## 2025-07-04 RX ORDER — PROCHLORPERAZINE EDISYLATE 5 MG/ML
5 INJECTION INTRAMUSCULAR; INTRAVENOUS EVERY 30 MIN PRN
Status: DISCONTINUED | OUTPATIENT
Start: 2025-07-04 | End: 2025-07-04

## 2025-07-04 RX ORDER — GLUCAGON 1 MG
1 KIT INJECTION
Status: DISCONTINUED | OUTPATIENT
Start: 2025-07-04 | End: 2025-07-04

## 2025-07-04 RX ORDER — SODIUM CHLORIDE, SODIUM LACTATE, POTASSIUM CHLORIDE, CALCIUM CHLORIDE 600; 310; 30; 20 MG/100ML; MG/100ML; MG/100ML; MG/100ML
1000 INJECTION, SOLUTION INTRAVENOUS CONTINUOUS
Status: DISCONTINUED | OUTPATIENT
Start: 2025-07-05 | End: 2025-07-05

## 2025-07-04 RX ORDER — IPRATROPIUM BROMIDE AND ALBUTEROL SULFATE 2.5; .5 MG/3ML; MG/3ML
3 SOLUTION RESPIRATORY (INHALATION)
Status: DISCONTINUED | OUTPATIENT
Start: 2025-07-04 | End: 2025-07-04

## 2025-07-04 RX ORDER — SODIUM CITRATE AND CITRIC ACID MONOHYDRATE 334; 500 MG/5ML; MG/5ML
30 SOLUTION ORAL ONCE
Status: DISCONTINUED | OUTPATIENT
Start: 2025-07-04 | End: 2025-07-04

## 2025-07-04 RX ORDER — FUROSEMIDE 10 MG/ML
20 INJECTION INTRAMUSCULAR; INTRAVENOUS ONCE
Status: COMPLETED | OUTPATIENT
Start: 2025-07-05 | End: 2025-07-04

## 2025-07-04 RX ORDER — LABETALOL HYDROCHLORIDE 5 MG/ML
40 INJECTION, SOLUTION INTRAVENOUS ONCE AS NEEDED
Status: DISCONTINUED | OUTPATIENT
Start: 2025-07-05 | End: 2025-07-08 | Stop reason: HOSPADM

## 2025-07-04 RX ORDER — METOCLOPRAMIDE HYDROCHLORIDE 5 MG/ML
10 INJECTION INTRAMUSCULAR; INTRAVENOUS ONCE
Status: DISCONTINUED | OUTPATIENT
Start: 2025-07-04 | End: 2025-07-04

## 2025-07-04 RX ORDER — LABETALOL HYDROCHLORIDE 5 MG/ML
80 INJECTION, SOLUTION INTRAVENOUS ONCE AS NEEDED
Status: DISCONTINUED | OUTPATIENT
Start: 2025-07-05 | End: 2025-07-08 | Stop reason: HOSPADM

## 2025-07-04 RX ORDER — MAGNESIUM SULFATE HEPTAHYDRATE 40 MG/ML
1 INJECTION, SOLUTION INTRAVENOUS CONTINUOUS
Status: DISCONTINUED | OUTPATIENT
Start: 2025-07-05 | End: 2025-07-06

## 2025-07-04 RX ORDER — HYDROMORPHONE HYDROCHLORIDE 2 MG/ML
0.2 INJECTION, SOLUTION INTRAMUSCULAR; INTRAVENOUS; SUBCUTANEOUS EVERY 5 MIN PRN
Status: DISCONTINUED | OUTPATIENT
Start: 2025-07-04 | End: 2025-07-04

## 2025-07-04 RX ADMIN — FUROSEMIDE 20 MG: 10 INJECTION, SOLUTION INTRAMUSCULAR; INTRAVENOUS at 11:07

## 2025-07-04 NOTE — H&P
[facility] - 2nd Floor Mother/Baby Unit  Obstetrics  History & Physical    Patient Name: Elvin Resendez  MRN: [id number]  Admission Date: 2025  Primary Care Provider: Glenis, Primary Doctor    Subjective:     Principal Problem:Previous  section    History of Present Illness: PAT    Obstetric HPI:  Patient reports None contractions, active fetal movement, No vaginal bleeding , No loss of fluid     This pregnancy has been complicated by HTN    OB History    Para Term  AB Living   2 2 1 1 0 2   SAB IAB Ectopic Multiple Live Births   0 0 0 0 2      # Outcome Date GA Lbr Reinier/2nd Weight Sex Type Anes PTL Lv   2 Term 25 39w0d  2.9 kg (6 lb 6.3 oz) F CS-LTranv Spinal N JUAN R      Name: Girl Elvin Resendez      Apgar1: 8  Apgar5: 9   1  22 34w1d  2.24 kg (4 lb 15 oz) M CS-LTranv Spinal Y JUAN R      Birth Comments: nicu attended for decelerations. handoff to steven @ 0700am.      Complications: Oligohydramnios      Name: EMILY,BOY ELVIN      Apgar1: 9  Apgar5: 10     Past Medical History:   Diagnosis Date    Thyroid disease      Past Surgical History:   Procedure Laterality Date     SECTION N/A 2022    Procedure:  SECTION;  Surgeon: Vasquez Salguero MD;  Location: [facility];  Service: OB/GYN;  Laterality: N/A;     SECTION WITH TUBAL LIGATION N/A 2025    Procedure:  SECTION, WITH TUBAL LIGATION;  Surgeon: Vasquez Salguero MD;  Location: [facility];  Service: OB/GYN;  Laterality: N/A;    TONSILLECTOMY         No medications prior to admission.       Review of patient's allergies indicates:   Allergen Reactions    Sulfa (sulfonamide antibiotics) Anaphylaxis        Family History       Problem Relation (Age of Onset)    Breast cancer Mother          Tobacco Use    Smoking status: Never    Smokeless tobacco: Never   Vaping Use    Vaping status: Never Used   Substance and Sexual Activity    Alcohol use: Not Currently    Drug use: Never     Sexual activity: Not on file     Review of Systems   Objective:     Vital Signs (Most Recent):  Temp: 98.1 °F (36.7 °C) (25)  Pulse: 106 (25)  Resp: 14 (25)  BP: (!) 137/92 (25)  SpO2: 95 % (25) Vital Signs (24h Range):  Temp:  [98.1 °F (36.7 °C)-99.4 °F (37.4 °C)] 98.1 °F (36.7 °C)  Pulse:  [106-108] 106  Resp:  [14-18] 14  SpO2:  [94 %-95 %] 95 %  BP: (137-146)/(86-92) 137/92     Weight: 65.8 kg (145 lb)  Body mass index is 25.69 kg/m².    FHT: 147Cat 1 (reassuring)  TOCO:  Q 0 minutes    Physical Exam    Cervix:  Dilation:  0  Effacement:  25%  Station: -3  Presentation: Vertex     Significant Labs:  Lab Results   Component Value Date    GROUPTRH O POS 2025    HEPBSAG Nonreactive 2024    STREPBCULT neg 2022       I have personallly reviewed all pertinent lab results from the last 24 hours.    Assessment/Plan:     34 y.o. female  at 39w0d for:    Active Diagnoses:    Diagnosis Date Noted POA    PRINCIPAL PROBLEM:  Previous  section [Z98.891] 2025 Not Applicable      Problems Resolved During this Admission:       SALVADOR Salguero MD  Obstetrics  [facility] - 2nd Floor Mother/Baby Unit

## 2025-07-05 PROBLEM — J98.4: Status: ACTIVE | Noted: 2025-07-05

## 2025-07-05 LAB
ALBUMIN SERPL-MCNC: 2.2 G/DL (ref 3.5–5)
ALBUMIN/GLOB SERPL: 0.5 RATIO (ref 1.1–2)
ALP SERPL-CCNC: 128 UNIT/L (ref 40–150)
ALT SERPL-CCNC: 15 UNIT/L (ref 0–55)
ANION GAP SERPL CALC-SCNC: 12 MEQ/L
APICAL FOUR CHAMBER EJECTION FRACTION: 37 %
APICAL TWO CHAMBER EJECTION FRACTION: 29 %
AST SERPL-CCNC: 16 UNIT/L (ref 11–45)
AV INDEX (PROSTH): 0.71
AV MEAN GRADIENT: 5 MMHG
AV PEAK GRADIENT: 9 MMHG
AV VALVE AREA BY VELOCITY RATIO: 2.5 CM²
AV VALVE AREA: 2.2 CM²
AV VELOCITY RATIO: 0.8
BASOPHILS # BLD AUTO: 0.05 X10(3)/MCL
BASOPHILS NFR BLD AUTO: 0.3 %
BILIRUB SERPL-MCNC: 0.4 MG/DL
BNP BLD-MCNC: 2787.4 PG/ML
BSA FOR ECHO PROCEDURE: 1.71 M2
BUN SERPL-MCNC: 7.6 MG/DL (ref 7–18.7)
CALCIUM SERPL-MCNC: 7.8 MG/DL (ref 8.4–10.2)
CHLORIDE SERPL-SCNC: 100 MMOL/L (ref 98–107)
CO2 SERPL-SCNC: 25 MMOL/L (ref 22–29)
CREAT SERPL-MCNC: 0.58 MG/DL (ref 0.55–1.02)
CREAT/UREA NIT SERPL: 13
CV ECHO LV RWT: 0.35 CM
DOP CALC AO PEAK VEL: 1.5 M/S
DOP CALC AO VTI: 24.2 CM
DOP CALC LVOT AREA: 3.1 CM2
DOP CALC LVOT DIAMETER: 2 CM
DOP CALC LVOT PEAK VEL: 1.2 M/S
DOP CALC LVOT STROKE VOLUME: 53.7 CM3
DOP CALC MV VTI: 9.4 CM
DOP CALCLVOT PEAK VEL VTI: 17.1 CM
E WAVE DECELERATION TIME: 58 MSEC
E/A RATIO: 0.65
E/E' RATIO: 10 M/S
ECHO LV POSTERIOR WALL: 0.9 CM (ref 0.6–1.1)
EOSINOPHIL # BLD AUTO: 0.01 X10(3)/MCL (ref 0–0.9)
EOSINOPHIL NFR BLD AUTO: 0.1 %
ERYTHROCYTE [DISTWIDTH] IN BLOOD BY AUTOMATED COUNT: 16.9 % (ref 11.5–17)
FRACTIONAL SHORTENING: 9.8 % (ref 28–44)
GFR SERPLBLD CREATININE-BSD FMLA CKD-EPI: >60 ML/MIN/1.73/M2
GLOBULIN SER-MCNC: 4.5 GM/DL (ref 2.4–3.5)
GLUCOSE SERPL-MCNC: 105 MG/DL (ref 74–100)
HCT VFR BLD AUTO: 34.9 % (ref 37–47)
HGB BLD-MCNC: 10.2 G/DL (ref 12–16)
IMM GRANULOCYTES # BLD AUTO: 0.37 X10(3)/MCL (ref 0–0.04)
IMM GRANULOCYTES NFR BLD AUTO: 1.9 %
INTERVENTRICULAR SEPTUM: 0.9 CM (ref 0.6–1.1)
LACTATE SERPL-SCNC: 1 MMOL/L (ref 0.5–2.2)
LEFT ATRIUM AREA SYSTOLIC (APICAL 2 CHAMBER): 17.5 CM2
LEFT ATRIUM AREA SYSTOLIC (APICAL 4 CHAMBER): 15.5 CM2
LEFT ATRIUM VOLUME INDEX MOD: 27 ML/M2
LEFT ATRIUM VOLUME MOD: 45 ML
LEFT INTERNAL DIMENSION IN SYSTOLE: 4.6 CM (ref 2.1–4)
LEFT VENTRICLE DIASTOLIC VOLUME INDEX: 73.37 ML/M2
LEFT VENTRICLE DIASTOLIC VOLUME: 124 ML
LEFT VENTRICLE END DIASTOLIC VOLUME APICAL 2 CHAMBER: 112 ML
LEFT VENTRICLE END DIASTOLIC VOLUME APICAL 4 CHAMBER: 121 ML
LEFT VENTRICLE END SYSTOLIC VOLUME APICAL 2 CHAMBER: 50.9 ML
LEFT VENTRICLE END SYSTOLIC VOLUME APICAL 4 CHAMBER: 37.2 ML
LEFT VENTRICLE MASS INDEX: 96.8 G/M2
LEFT VENTRICLE SYSTOLIC VOLUME INDEX: 57.4 ML/M2
LEFT VENTRICLE SYSTOLIC VOLUME: 97 ML
LEFT VENTRICULAR INTERNAL DIMENSION IN DIASTOLE: 5.1 CM (ref 3.5–6)
LEFT VENTRICULAR MASS: 163.6 G
LV LATERAL E/E' RATIO: 7.4 M/S
LV SEPTAL E/E' RATIO: 14.8 M/S
LVED V (TEICH): 124 ML
LVES V (TEICH): 97.3 ML
LVOT MG: 3 MMHG
LVOT MV: 0.81 CM/S
LYMPHOCYTES # BLD AUTO: 0.95 X10(3)/MCL (ref 0.6–4.6)
LYMPHOCYTES NFR BLD AUTO: 4.9 %
MAGNESIUM SERPL-MCNC: 4.9 MG/DL (ref 1.6–2.6)
MAGNESIUM SERPL-MCNC: 5 MG/DL (ref 1.6–2.6)
MCH RBC QN AUTO: 22.5 PG (ref 27–31)
MCHC RBC AUTO-ENTMCNC: 29.2 G/DL (ref 33–36)
MCV RBC AUTO: 77 FL (ref 80–94)
MONOCYTES # BLD AUTO: 0.84 X10(3)/MCL (ref 0.1–1.3)
MONOCYTES NFR BLD AUTO: 4.4 %
MRSA PCR SCRN (OHS): NOT DETECTED
MV MEAN GRADIENT: 3 MMHG
MV PEAK A VEL: 1.37 M/S
MV PEAK E VEL: 0.89 M/S
MV PEAK GRADIENT: 7 MMHG
MV VALVE AREA BY CONTINUITY EQUATION: 5.71 CM2
NEUTROPHILS # BLD AUTO: 17.07 X10(3)/MCL (ref 2.1–9.2)
NEUTROPHILS NFR BLD AUTO: 88.4 %
NRBC BLD AUTO-RTO: 0 %
OHS CV RV/LV RATIO: 0.39 CM
OHS LV EJECTION FRACTION SIMPSONS BIPLANE MOD: 33 %
OHS QRS DURATION: 72 MS
OHS QTC CALCULATION: 463 MS
PHOSPHATE SERPL-MCNC: 3.8 MG/DL (ref 2.3–4.7)
PLATELET # BLD AUTO: 350 X10(3)/MCL (ref 130–400)
PMV BLD AUTO: 10.4 FL (ref 7.4–10.4)
POTASSIUM SERPL-SCNC: 3.6 MMOL/L (ref 3.5–5.1)
PROT SERPL-MCNC: 6.7 GM/DL (ref 6.4–8.3)
RBC # BLD AUTO: 4.53 X10(6)/MCL (ref 4.2–5.4)
RIGHT VENTRICLE DIASTOLIC BASEL DIMENSION: 2 CM
RIGHT VENTRICULAR END-DIASTOLIC DIMENSION: 2 CM
SODIUM SERPL-SCNC: 137 MMOL/L (ref 136–145)
T4 FREE SERPL-MCNC: 1 NG/DL (ref 0.7–1.48)
TDI LATERAL: 0.12 M/S
TDI SEPTAL: 0.06 M/S
TDI: 0.09 M/S
TRICUSPID ANNULAR PLANE SYSTOLIC EXCURSION: 1.3 CM
TROPONIN I SERPL-MCNC: 0.1 NG/ML (ref 0–0.04)
TROPONIN I SERPL-MCNC: 0.11 NG/ML (ref 0–0.04)
TROPONIN I SERPL-MCNC: 0.12 NG/ML (ref 0–0.04)
TROPONIN I SERPL-MCNC: 0.14 NG/ML (ref 0–0.04)
WBC # BLD AUTO: 19.29 X10(3)/MCL (ref 4.5–11.5)
Z-SCORE OF LEFT VENTRICULAR DIMENSION IN END DIASTOLE: 0.8
Z-SCORE OF LEFT VENTRICULAR DIMENSION IN END SYSTOLE: 3.61

## 2025-07-05 PROCEDURE — 96372 THER/PROPH/DIAG INJ SC/IM: CPT

## 2025-07-05 PROCEDURE — 80053 COMPREHEN METABOLIC PANEL: CPT

## 2025-07-05 PROCEDURE — 83605 ASSAY OF LACTIC ACID: CPT

## 2025-07-05 PROCEDURE — 93005 ELECTROCARDIOGRAM TRACING: CPT

## 2025-07-05 PROCEDURE — 83735 ASSAY OF MAGNESIUM: CPT | Mod: 91 | Performed by: INTERNAL MEDICINE

## 2025-07-05 PROCEDURE — 63600175 PHARM REV CODE 636 W HCPCS: Performed by: OBSTETRICS & GYNECOLOGY

## 2025-07-05 PROCEDURE — 25500020 PHARM REV CODE 255: Performed by: OBSTETRICS & GYNECOLOGY

## 2025-07-05 PROCEDURE — 25000003 PHARM REV CODE 250

## 2025-07-05 PROCEDURE — G0378 HOSPITAL OBSERVATION PER HR: HCPCS

## 2025-07-05 PROCEDURE — 63600175 PHARM REV CODE 636 W HCPCS: Performed by: INTERNAL MEDICINE

## 2025-07-05 PROCEDURE — 84100 ASSAY OF PHOSPHORUS: CPT

## 2025-07-05 PROCEDURE — 25000003 PHARM REV CODE 250: Performed by: INTERNAL MEDICINE

## 2025-07-05 PROCEDURE — 84484 ASSAY OF TROPONIN QUANT: CPT | Mod: 91

## 2025-07-05 PROCEDURE — 83880 ASSAY OF NATRIURETIC PEPTIDE: CPT

## 2025-07-05 PROCEDURE — 63600175 PHARM REV CODE 636 W HCPCS

## 2025-07-05 PROCEDURE — 96376 TX/PRO/DX INJ SAME DRUG ADON: CPT

## 2025-07-05 PROCEDURE — 93010 ELECTROCARDIOGRAM REPORT: CPT | Mod: ,,, | Performed by: INTERNAL MEDICINE

## 2025-07-05 PROCEDURE — 96375 TX/PRO/DX INJ NEW DRUG ADDON: CPT

## 2025-07-05 PROCEDURE — 63700000 PHARM REV CODE 250 ALT 637 W/O HCPCS

## 2025-07-05 PROCEDURE — 51702 INSERT TEMP BLADDER CATH: CPT

## 2025-07-05 PROCEDURE — 96365 THER/PROPH/DIAG IV INF INIT: CPT

## 2025-07-05 PROCEDURE — 87040 BLOOD CULTURE FOR BACTERIA: CPT | Mod: 91

## 2025-07-05 PROCEDURE — 96367 TX/PROPH/DG ADDL SEQ IV INF: CPT

## 2025-07-05 PROCEDURE — 96366 THER/PROPH/DIAG IV INF ADDON: CPT

## 2025-07-05 PROCEDURE — 36415 COLL VENOUS BLD VENIPUNCTURE: CPT

## 2025-07-05 PROCEDURE — 83735 ASSAY OF MAGNESIUM: CPT

## 2025-07-05 PROCEDURE — 85025 COMPLETE CBC W/AUTO DIFF WBC: CPT

## 2025-07-05 PROCEDURE — 84439 ASSAY OF FREE THYROXINE: CPT | Performed by: INTERNAL MEDICINE

## 2025-07-05 PROCEDURE — 27000221 HC OXYGEN, UP TO 24 HOURS

## 2025-07-05 PROCEDURE — 87641 MR-STAPH DNA AMP PROBE: CPT

## 2025-07-05 RX ORDER — NIFEDIPINE 10 MG/1
10 CAPSULE ORAL ONCE
Status: COMPLETED | OUTPATIENT
Start: 2025-07-05 | End: 2025-07-05

## 2025-07-05 RX ORDER — NIFEDIPINE 10 MG/1
10 CAPSULE ORAL ONCE
Status: DISCONTINUED | OUTPATIENT
Start: 2025-07-05 | End: 2025-07-05

## 2025-07-05 RX ORDER — FUROSEMIDE 10 MG/ML
20 INJECTION INTRAMUSCULAR; INTRAVENOUS DAILY
Status: DISCONTINUED | OUTPATIENT
Start: 2025-07-05 | End: 2025-07-06

## 2025-07-05 RX ORDER — FUROSEMIDE 10 MG/ML
20 INJECTION INTRAMUSCULAR; INTRAVENOUS DAILY
Status: DISCONTINUED | OUTPATIENT
Start: 2025-07-05 | End: 2025-07-05

## 2025-07-05 RX ORDER — ENOXAPARIN SODIUM 100 MG/ML
40 INJECTION SUBCUTANEOUS EVERY 24 HOURS
Status: DISCONTINUED | OUTPATIENT
Start: 2025-07-05 | End: 2025-07-08 | Stop reason: HOSPADM

## 2025-07-05 RX ORDER — HYDROCODONE BITARTRATE AND ACETAMINOPHEN 5; 325 MG/1; MG/1
1 TABLET ORAL EVERY 6 HOURS PRN
Refills: 0 | Status: DISCONTINUED | OUTPATIENT
Start: 2025-07-05 | End: 2025-07-08 | Stop reason: HOSPADM

## 2025-07-05 RX ORDER — CEFTRIAXONE 1 G/1
2 INJECTION, POWDER, FOR SOLUTION INTRAMUSCULAR; INTRAVENOUS
Status: DISCONTINUED | OUTPATIENT
Start: 2025-07-05 | End: 2025-07-05

## 2025-07-05 RX ORDER — AMOXICILLIN 250 MG
1 CAPSULE ORAL DAILY PRN
Status: DISCONTINUED | OUTPATIENT
Start: 2025-07-05 | End: 2025-07-08 | Stop reason: HOSPADM

## 2025-07-05 RX ORDER — AZITHROMYCIN 250 MG/1
500 TABLET, FILM COATED ORAL DAILY
Status: DISCONTINUED | OUTPATIENT
Start: 2025-07-05 | End: 2025-07-06

## 2025-07-05 RX ORDER — POLYETHYLENE GLYCOL 3350 17 G/17G
17 POWDER, FOR SOLUTION ORAL 2 TIMES DAILY PRN
Status: DISCONTINUED | OUTPATIENT
Start: 2025-07-05 | End: 2025-07-08 | Stop reason: HOSPADM

## 2025-07-05 RX ORDER — ACETAMINOPHEN 325 MG/1
650 TABLET ORAL EVERY 6 HOURS PRN
Status: DISCONTINUED | OUTPATIENT
Start: 2025-07-05 | End: 2025-07-08 | Stop reason: HOSPADM

## 2025-07-05 RX ADMIN — MAGNESIUM SULFATE HEPTAHYDRATE 4 G: 40 INJECTION, SOLUTION INTRAVENOUS at 12:07

## 2025-07-05 RX ADMIN — MAGNESIUM SULFATE HEPTAHYDRATE 2 G/HR: 40 INJECTION, SOLUTION INTRAVENOUS at 12:07

## 2025-07-05 RX ADMIN — IOHEXOL 100 ML: 350 INJECTION, SOLUTION INTRAVENOUS at 01:07

## 2025-07-05 RX ADMIN — CEFTRIAXONE SODIUM 2 G: 1 INJECTION, POWDER, FOR SOLUTION INTRAMUSCULAR; INTRAVENOUS at 02:07

## 2025-07-05 RX ADMIN — ENOXAPARIN SODIUM 40 MG: 40 INJECTION SUBCUTANEOUS at 05:07

## 2025-07-05 RX ADMIN — SODIUM CHLORIDE, POTASSIUM CHLORIDE, SODIUM LACTATE AND CALCIUM CHLORIDE 1000 ML: 600; 310; 30; 20 INJECTION, SOLUTION INTRAVENOUS at 12:07

## 2025-07-05 RX ADMIN — NIFEDIPINE 10 MG: 10 CAPSULE ORAL at 03:07

## 2025-07-05 RX ADMIN — PIPERACILLIN SODIUM AND TAZOBACTAM SODIUM 4.5 G: 4; .5 INJECTION, POWDER, LYOPHILIZED, FOR SOLUTION INTRAVENOUS at 11:07

## 2025-07-05 RX ADMIN — PIPERACILLIN SODIUM AND TAZOBACTAM SODIUM 4.5 G: 4; .5 INJECTION, POWDER, LYOPHILIZED, FOR SOLUTION INTRAVENOUS at 06:07

## 2025-07-05 RX ADMIN — VANCOMYCIN HYDROCHLORIDE 1250 MG: 1.25 INJECTION, POWDER, LYOPHILIZED, FOR SOLUTION INTRAVENOUS at 11:07

## 2025-07-05 RX ADMIN — FUROSEMIDE 20 MG: 10 INJECTION, SOLUTION INTRAMUSCULAR; INTRAVENOUS at 06:07

## 2025-07-05 RX ADMIN — AZITHROMYCIN DIHYDRATE 500 MG: 250 TABLET ORAL at 11:07

## 2025-07-05 RX ADMIN — ACETAMINOPHEN 650 MG: 325 TABLET ORAL at 05:07

## 2025-07-05 NOTE — H&P
[facility] - Antepartum  Pulmonary Critical Care Note    Patient Name: Irene Resendez  MRN: [id number]  Admission Date: 2025  Hospital Length of Stay: 0 days  Code Status: Prior  Attending Provider: Vasquez Salguero MD  Primary Care Provider: Glenis, Primary Doctor     Subjective:     HPI:   34 year old female with PMHx hyperthyroidism (untreated),  recently had  delivery ~5 days prior with tubal ligation as well; was uneventful and delivered via elective C section with no complications. Patient reports that since early this AM, she has been increasingly short of breath unassociated with exertion. Patient reports that her SOB is also mildly worsened with lying flat as well. Has also complained of R shoulder pain for this same period. Patient has never had these symptoms before at previous pregnancy. Also reports that her incision has been healing well and that her post-op pain is mostly controlled. Denies blurry vision, fevers, N/V/D, abd pain, headaches, chest pain, seizures. Also denies significant vaginal bleeding, or dysuria. Upon presentation to ED, patient was hypoxic, tachycardic, with . Required 5L NC. Was given IV MgSO4 4g loading dose and was given x1 dose IV lasix 20 mg. Patient reportedly feels mildly improved after these interventions. CXR with multifocal PNA vs congestion, labs notable for WBC 17, CMP unremarkable; no signs of end organ damage. Patient will be admitted to ICU due to acute respiratory failure in the setting of possible post-partum preeclampsia.     Hospital Course/Significant events:      24 Hour Interval History:      Past Medical History:   Diagnosis Date    Thyroid disease        Past Surgical History:   Procedure Laterality Date     SECTION N/A 2022    Procedure:  SECTION;  Surgeon: Vasquez Salguero MD;  Location: [facility] L&D;  Service: OB/GYN;  Laterality: N/A;     SECTION WITH TUBAL LIGATION N/A 2025     Procedure:  SECTION, WITH TUBAL LIGATION;  Surgeon: Vasquez Salguero MD;  Location: [facility];  Service: OB/GYN;  Laterality: N/A;    TONSILLECTOMY         Social History[1]        Current Outpatient Medications   Medication Instructions    ibuprofen (ADVIL,MOTRIN) 600 mg, 3 times daily    oxyCODONE-acetaminophen (PERCOCET) 5-325 mg per tablet 1 tablet, Every 4 hours PRN       Review of patient's allergies indicates:   Allergen Reactions    Sulfa (sulfonamide antibiotics) Anaphylaxis        Current Inpatient Medications   cefTRIAXone (Rocephin) IV (PEDS and ADULTS)  2 g Intravenous Q24H    furosemide (LASIX) injection  20 mg Intravenous Daily       Current Intravenous Infusions   lactated ringers  1,000 mL Intravenous Continuous 75 mL/hr at 25 0015 1,000 mL at 25 0015    magnesium sulfate in water  2 g/hr Intravenous Continuous 50 mL/hr at 25 0037 2 g/hr at 25 0037         Review of Systems   Constitutional:  Negative for chills and fever.   Eyes:  Negative for blurred vision and double vision.   Respiratory:  Positive for shortness of breath. Negative for cough, hemoptysis and sputum production.    Gastrointestinal:  Negative for abdominal pain, diarrhea, nausea and vomiting.   Genitourinary:  Negative for dysuria, flank pain, frequency, hematuria and urgency.   Musculoskeletal:  Positive for back pain and joint pain.   Neurological:  Negative for dizziness, tingling, tremors, sensory change, focal weakness, seizures, loss of consciousness, weakness and headaches.          Objective:     No intake or output data in the 24 hours ending 258      Vital Signs (Most Recent):  Temp: 98.8 °F (37.1 °C) (258)  Pulse: (!) 122 (25)  Resp: (!) 28 (255)  BP: (!) 141/97 (257)  SpO2: 97 % (25)  Body mass index is 25.69 kg/m².  Weight: 65.8 kg (145 lb) Vital Signs (24h Range):  Temp:  [98.3 °F (36.8 °C)-98.8 °F (37.1 °C)] 98.8 °F (37.1  "°C)  Pulse:  [104-152] 122  Resp:  [28-48] 28  SpO2:  [82 %-100 %] 97 %  BP: (139-161)/() 141/97     Physical Exam  Constitutional:       General: She is in acute distress.      Appearance: She is ill-appearing. She is not toxic-appearing.   Eyes:      Extraocular Movements: Extraocular movements intact.      Pupils: Pupils are equal, round, and reactive to light.   Cardiovascular:      Rate and Rhythm: Regular rhythm. Tachycardia present.   Pulmonary:      Comments: Mild crackles bilaterally, diminished breath sounds bilaterally; tachypneic  Abdominal:      General: Abdomen is flat. There is no distension.      Palpations: Abdomen is soft.      Comments: C section incision C/D/I   Musculoskeletal:      Right lower leg: No edema.      Left lower leg: No edema.   Skin:     General: Skin is warm and dry.      Capillary Refill: Capillary refill takes less than 2 seconds.   Neurological:      General: No focal deficit present.           Lines/Drains/Airways       Drain  Duration                  Urethral Catheter 07/05/25 0017 Double-lumen;Non-latex 16 Fr. <1 day              Peripheral Intravenous Line  Duration             Peripheral IV 07/04/25 2101 20 G Posterior;Right Hand <1 day                    Significant Labs:    Lab Results   Component Value Date    WBC 17.33 (H) 07/04/2025    HGB 8.5 (L) 07/04/2025    HCT 29.5 (L) 07/04/2025    MCV 78.5 (L) 07/04/2025     07/04/2025           BMP  Lab Results   Component Value Date     07/04/2025    K 3.7 07/04/2025    CO2 26 07/04/2025    BUN 8.2 07/04/2025    CREATININE 0.70 07/04/2025    CALCIUM 7.9 (L) 07/04/2025    AGAP 10.0 07/04/2025    EGFRNONAA >60 05/31/2022         ABG  No results for input(s): "PH", "PO2", "PCO2", "HCO3", "POCBASEDEF" in the last 168 hours.    Mechanical Ventilation Support:         Significant Imaging:  I have reviewed the pertinent imaging within the past 24 hours.        Assessment/Plan:     Assessment  Acute hypoxic " respiratory failure  Post-partum pre-eclampsia  Possible peripartum cardiomyopathy  Sepsis 2/2 multifocal PNA  S/p  25, LTRC  Hx of Hyperthyroidism  Microcytic Anemia, post op      Plan  Will admit to ICU due to preeclampsia with hypoxic respiratory failure  Appreciate assistance from OB/GYN  Will continue prn BP management, BP goal <160/110  Will continue IV MgSO4, s/p bolus; continue at gtt 2g/hr  Will continue IV diuresis, IV lasix 20 mg daily; will consider beta blocker in AM  Will start IV abx for possible multifocal PNA  Pending blood cultures, resp panel, resp culture, MRSA PCR  Will get echo, CT PE to rule out other causes of post-pregnancy associated respiratory failure    DVT Prophylaxis: Lovenox  GI Prophylaxis: none     32 minutes of critical care was time spent personally by me on the following activities: development of treatment plan with patient or surrogate and bedside caregivers, discussions with consultants, evaluation of patient's response to treatment, examination of patient, ordering and performing treatments and interventions, ordering and review of laboratory studies, ordering and review of radiographic studies, pulse oximetry, re-evaluation of patient's condition.  This critical care time did not overlap with that of any other provider or involve time for any procedures.     Agusto Meyers MD  Pulmonary Critical Care Medicine PGY-2  [facility]  DOS: 2025          [1]   Social History  Socioeconomic History    Marital status:    Tobacco Use    Smoking status: Never    Smokeless tobacco: Never   Vaping Use    Vaping status: Never Used   Substance and Sexual Activity    Alcohol use: Not Currently    Drug use: Never     Social Drivers of Health     Financial Resource Strain: Low Risk  (2025)    Overall Financial Resource Strain (CARDIA)     Difficulty of Paying Living Expenses: Not hard at all   Food Insecurity: No Food  Insecurity (6/30/2025)    Hunger Vital Sign     Worried About Running Out of Food in the Last Year: Never true     Ran Out of Food in the Last Year: Never true   Transportation Needs: No Transportation Needs (6/30/2025)    PRAPARE - Transportation     Lack of Transportation (Medical): No     Lack of Transportation (Non-Medical): No   Stress: No Stress Concern Present (6/30/2025)    [employer] - Occupational Stress Questionnaire     Feeling of Stress : Not at all   Housing Stability: Low Risk  (6/30/2025)    Housing Stability Vital Sign     Unable to Pay for Housing in the Last Year: No     Homeless in the Last Year: No

## 2025-07-05 NOTE — H&P
[facility] - Antepartum  Obstetrics & Gynecology  History & Physical    Patient Name: Elvin Resendez  MRN: [id number]  Admission Date: 2025  Primary Care Provider: Glenis, Primary Doctor    Subjective:     Chief Complaint/Reason for Admission: Shortness of breath and postpartum preeclampsia    History of Present Illness:  34 yr  who delivered 25 via RLTCS presents with shortness of breath.  Pt was discharged on 7/3/25.  Pt denies HA, visual changes or RUQ pain.  Pt states no complications with pregnancy or immediately after delivery.        OB History    Para Term  AB Living   2 2 1 1 0 2   SAB IAB Ectopic Multiple Live Births   0 0 0 0 2      # Outcome Date GA Lbr Reinier/2nd Weight Sex Type Anes PTL Lv   2 Term 25 39w0d  2.9 kg (6 lb 6.3 oz) F CS-LTranv Spinal N JUAN R      Name: Girl Elvin Resendez      Apgar1: 8  Apgar5: 9   1  22 34w1d  2.24 kg (4 lb 15 oz) M CS-LTranv Spinal Y JUAN R      Birth Comments: nicu attended for decelerations. handoff to steven @ 0700am.      Complications: Oligohydramnios      Name: EMILY,BOY ELVIN      Apgar1: 9  Apgar5: 10     Past Medical History:   Diagnosis Date    Thyroid disease      Past Surgical History:   Procedure Laterality Date     SECTION N/A 2022    Procedure:  SECTION;  Surgeon: Vasquez Salguero MD;  Location: [facility] L&D;  Service: OB/GYN;  Laterality: N/A;     SECTION WITH TUBAL LIGATION N/A 2025    Procedure:  SECTION, WITH TUBAL LIGATION;  Surgeon: Vasquez Salguero MD;  Location: [facility] L&D;  Service: OB/GYN;  Laterality: N/A;    TONSILLECTOMY         (Not in a hospital admission)      Review of patient's allergies indicates:   Allergen Reactions    Sulfa (sulfonamide antibiotics) Anaphylaxis        Family History       Problem Relation (Age of Onset)    Breast cancer Mother          Tobacco Use    Smoking status: Never    Smokeless tobacco: Never   Vaping Use     Vaping status: Never Used   Substance and Sexual Activity    Alcohol use: Not Currently    Drug use: Never    Sexual activity: Not on file     Review of Systems   Constitutional: Negative.    Respiratory: Negative.     Cardiovascular: Negative.    Gastrointestinal: Negative.    Endocrine: Negative.    Genitourinary: Negative.    Musculoskeletal: Negative.    Neurological: Negative.    Psychiatric/Behavioral: Negative.     Breast: negative.       Objective:     Vital Signs (Most Recent):  Temp: 98.8 °F (37.1 °C) (07/04/25 2318)  Pulse: (!) 127 (07/04/25 2358)  BP: (!) 152/106 (07/04/25 2357)  SpO2: (!) 82 % (07/04/25 2355) Vital Signs (24h Range):  Temp:  [98.3 °F (36.8 °C)-98.8 °F (37.1 °C)] 98.8 °F (37.1 °C)  Pulse:  [104-152] 127  SpO2:  [82 %-100 %] 82 %  BP: (139-161)/() 152/106     Weight: 65.8 kg (145 lb)  Body mass index is 25.69 kg/m².    Patient's last menstrual period was 09/30/2024 (exact date).     Physical Exam:   Constitutional: She is oriented to person, place, and time. She appears well-developed and well-nourished. She appears distressed.    HENT:   Head: Normocephalic and atraumatic.     Neck: No thyromegaly present.     Pulmonary/Chest: She has no wheezes. She has no rales. She exhibits no tenderness.   Breathing is labored; accessory muscles used        Abdominal: Soft. She exhibits abdominal incision ([facility] wound CDI, staples still in place). She exhibits no distension and no mass. There is no abdominal tenderness. There is no rebound and no guarding.             Musculoskeletal: Normal range of motion and moves all extremeties. No tenderness.       Neurological: She is alert and oriented to person, place, and time. No cranial nerve deficit. Coordination normal.    Skin: Skin is warm. She is diaphoretic. No erythema.    Psychiatric: She has a normal mood and affect. Her behavior is normal. Judgment and thought content normal.        Laboratory:  Recent Lab Results         07/04/25 2059         Albumin/Globulin Ratio 0.6       Albumin 2.1              ALT 15       Anion Gap 10.0       Appearance, UA Clear       AST 16       Bacteria, UA None Seen       Baso # 0.04       Basophil % 0.2       Bilirubin (UA) Negative       BILIRUBIN TOTAL 0.5       BUN 8.2       BUN/CREAT RATIO 12       Calcium 7.9       Chloride 104       CO2 26       Color, UA Yellow       Creatinine 0.70       Urine Creatinine 147.4       eGFR >60  Comment: Estimated GFR calculated using the CKD-EPI creatinine (2021) equation.       Eos # 0.13       Eos % 0.8       Globulin, Total 3.5       Glucose 97       Glucose, UA Normal       Group & Rh O POS       Hematocrit 29.5       Hemoglobin 8.5       Hyaline Casts, UA 0-2       Immature Grans (Abs) 0.30       Immature Granulocytes 1.7       Indirect Lyn GEL NEG       Ketones, UA Negative       Lactate Dehydrogenase 254       Leukocyte Esterase, UA Negative       Lymph # 2.08       LYMPH % 12.0       MCH 22.6       MCHC 28.8       MCV 78.5       Mono # 1.15       Mono % 6.6       MPV 10.6       Neut # 13.63       Neut % 78.7       NITRITE UA Negative       nRBC 0.0       Blood, UA 1+       pH, UA 6.5       Platelet Count 321       Potassium 3.7       PROTEIN TOTAL 5.6       Protein, UA 3+       Urine Protein 716.1       RBC 3.76       RBC, UA 0-5       RDW 16.5       Sodium 140       Specific Gravity,UA 1.021       Specimen Outdate 07/07/2025 23:59       Squamous Epithelial Cells, UA None Seen       TSH 1.490       Uric Acid 4.0       URINE PROTEIN/CREATININE RATIO ([facility]) 4.9       Urobilinogen, UA Normal       WBC, UA 0-5       WBC 17.33             I have personallly reviewed all pertinent lab results from the last 24 hours.    Diagnostic Results:  CXR:  Impression:  1. There is dense hazy opacity in the perihilar regions bilaterally which extends to the lung bases bilaterally obscuring portions of both heart borders. There is some patchy hazy opacity in the right upper lobe with  the left upper lobe appearing clear. These findings are suggestive of multi lobar pneumonia. Correlate with clinical and laboratory findings and recommend continued interval follow-up to full resolution as indicated  Assessment/Plan:     Pulmonary  Non-infectious pneumonia  IV lasix 20 mg to be given x 1    Obstetric  * Preeclampsia in postpartum period  Mag sulfate to be started, BP to be observed for now; care transferred to Dr. Vasquez Salguero    Previous  section  Routine postop care    Pt noted to have low O2 saturation between 88-92%.  Pt's breathing was clearly labored.  Pt had mildly elevated BP's.  Pre-e labs were positive for PC ratio increased from 1.1 on 25 to 4.9.  Chest Xray suggestive of pneumonia.    Omero Han MD  Obstetrics & Gynecology  [facility] - Antepartum

## 2025-07-05 NOTE — PROGRESS NOTES
"Pharmacokinetic Initial Assessment: IV Vancomycin    Assessment/Plan:    Initiate intravenous vancomycin 1250 mg IV q12h  Desired empiric serum trough concentration is 15 to 20 mcg/mL  Draw vancomycin trough level 60 min prior to fourth dose on 07/06 at approximately 2230  Pharmacy will continue to follow and monitor vancomycin.      Please contact pharmacy at extension [phone] with any questions regarding this assessment.     Thank you for the consult,   Cipriano Cole       Patient brief summary:  Irene Resendez is a 34 y.o. female initiated on antimicrobial therapy with IV Vancomycin for treatment of suspected pneumonia    Drug Allergies:   Review of patient's allergies indicates:   Allergen Reactions    Sulfa (sulfonamide antibiotics) Anaphylaxis       Actual Body Weight:   65.8 kg    Renal Function:   Estimated Creatinine Clearance: 127.1 mL/min (based on SCr of 0.58 mg/dL).,     Dialysis Method (if applicable):  N/A    CBC (last 72 hours):  Recent Labs   Lab Result Units 07/04/25 2059 07/05/25  0417   WBC x10(3)/mcL 17.33* 19.29*   Hgb g/dL 8.5* 10.2*   Hct % 29.5* 34.9*   Platelet x10(3)/mcL 321 350   Mono % % 6.6 4.4   Eos % % 0.8 0.1   Basophil % % 0.2 0.3       Metabolic Panel (last 72 hours):  Recent Labs   Lab Result Units 07/04/25 2059 07/05/25  0417   Sodium mmol/L 140 137   Potassium mmol/L 3.7 3.6   Chloride mmol/L 104 100   CO2 mmol/L 26 25   Glucose mg/dL 97 105*   Glucose, UA  Normal  --    Blood Urea Nitrogen mg/dL 8.2 7.6   Creatinine mg/dL 0.70 0.58   Urine Creatinine mg/dL 147.4*  --    Albumin g/dL 2.1* 2.2*   Bilirubin Total mg/dL 0.5 0.4   ALP unit/L 116 128   AST unit/L 16 16   ALT unit/L 15 15   Magnesium Level mg/dL  --  4.90*   Phosphorus Level mg/dL  --  3.8       Drug levels (last 3 results):  No results for input(s): "VANCOMYCINRA", "VANCORANDOM", "VANCOMYCINPE", "VANCOPEAK", "VANCOMYCINTR", "VANCOTROUGH" in the last 72 hours.    Microbiologic Results:  Microbiology Results (last 7 days) "       Procedure Component Value Units Date/Time    Blood Culture [[id number]] Collected: 07/05/25 0710    Order Status: Resulted Specimen: Blood from Hand, Left Updated: 07/05/25 0714    Blood Culture [[id number]] Collected: 07/05/25 0417    Order Status: Resulted Specimen: Blood from Antecubital, Left Updated: 07/05/25 0425    Respiratory Culture [[id number]]     Order Status: Sent Specimen: Sputum

## 2025-07-05 NOTE — SUBJECTIVE & OBJECTIVE
OB History    Para Term  AB Living   2 2 1 1 0 2   SAB IAB Ectopic Multiple Live Births   0 0 0 0 2      # Outcome Date GA Lbr Reinier/2nd Weight Sex Type Anes PTL Lv   2 Term 25 39w0d  2.9 kg (6 lb 6.3 oz) F CS-LTranv Spinal N JUAN R      Name: Ann Marie Resendez      Apgar1: 8  Apgar5: 9   1  22 34w1d  2.24 kg (4 lb 15 oz) M CS-LTranv Spinal Y JUAN R      Birth Comments: nicu attended for decelerations. handoff to steven @ 0700am.      Complications: Oligohydramnios      Name: EMILY,BOY ELVIN      Apgar1: 9  Apgar5: 10     Past Medical History:   Diagnosis Date    Thyroid disease      Past Surgical History:   Procedure Laterality Date     SECTION N/A 2022    Procedure:  SECTION;  Surgeon: Vasquez Salguero MD;  Location: [facility] L&D;  Service: OB/GYN;  Laterality: N/A;     SECTION WITH TUBAL LIGATION N/A 2025    Procedure:  SECTION, WITH TUBAL LIGATION;  Surgeon: Vasquez Salguero MD;  Location: [facility] L&D;  Service: OB/GYN;  Laterality: N/A;    TONSILLECTOMY         (Not in a hospital admission)      Review of patient's allergies indicates:   Allergen Reactions    Sulfa (sulfonamide antibiotics) Anaphylaxis        Family History       Problem Relation (Age of Onset)    Breast cancer Mother          Tobacco Use    Smoking status: Never    Smokeless tobacco: Never   Vaping Use    Vaping status: Never Used   Substance and Sexual Activity    Alcohol use: Not Currently    Drug use: Never    Sexual activity: Not on file     Review of Systems   Constitutional: Negative.    Respiratory: Negative.     Cardiovascular: Negative.    Gastrointestinal: Negative.    Endocrine: Negative.    Genitourinary: Negative.    Musculoskeletal: Negative.    Neurological: Negative.    Psychiatric/Behavioral: Negative.     Breast: negative.       Objective:     Vital Signs (Most Recent):  Temp: 98.8 °F (37.1 °C) (25)  Pulse: (!) 127 (25)  BP: (!)  152/106 (07/04/25 2357)  SpO2: (!) 82 % (07/04/25 9295) Vital Signs (24h Range):  Temp:  [98.3 °F (36.8 °C)-98.8 °F (37.1 °C)] 98.8 °F (37.1 °C)  Pulse:  [104-152] 127  SpO2:  [82 %-100 %] 82 %  BP: (139-161)/() 152/106     Weight: 65.8 kg (145 lb)  Body mass index is 25.69 kg/m².    Patient's last menstrual period was 09/30/2024 (exact date).     Physical Exam:   Constitutional: She is oriented to person, place, and time. She appears well-developed and well-nourished. She appears distressed.    HENT:   Head: Normocephalic and atraumatic.     Neck: No thyromegaly present.     Pulmonary/Chest: She has no wheezes. She has no rales. She exhibits no tenderness.   Breathing is labored; accessory muscles used        Abdominal: Soft. She exhibits abdominal incision ([facility] wound CDI, staples still in place). She exhibits no distension and no mass. There is no abdominal tenderness. There is no rebound and no guarding.             Musculoskeletal: Normal range of motion and moves all extremeties. No tenderness.       Neurological: She is alert and oriented to person, place, and time. No cranial nerve deficit. Coordination normal.    Skin: Skin is warm. She is diaphoretic. No erythema.    Psychiatric: She has a normal mood and affect. Her behavior is normal. Judgment and thought content normal.        Laboratory:  Recent Lab Results         07/04/25 2059        Albumin/Globulin Ratio 0.6       Albumin 2.1              ALT 15       Anion Gap 10.0       Appearance, UA Clear       AST 16       Bacteria, UA None Seen       Baso # 0.04       Basophil % 0.2       Bilirubin (UA) Negative       BILIRUBIN TOTAL 0.5       BUN 8.2       BUN/CREAT RATIO 12       Calcium 7.9       Chloride 104       CO2 26       Color, UA Yellow       Creatinine 0.70       Urine Creatinine 147.4       eGFR >60  Comment: Estimated GFR calculated using the CKD-EPI creatinine (2021) equation.       Eos # 0.13       Eos % 0.8       Globulin, Total  3.5       Glucose 97       Glucose, UA Normal       Group & Rh O POS       Hematocrit 29.5       Hemoglobin 8.5       Hyaline Casts, UA 0-2       Immature Grans (Abs) 0.30       Immature Granulocytes 1.7       Indirect Lyn GEL NEG       Ketones, UA Negative       Lactate Dehydrogenase 254       Leukocyte Esterase, UA Negative       Lymph # 2.08       LYMPH % 12.0       MCH 22.6       MCHC 28.8       MCV 78.5       Mono # 1.15       Mono % 6.6       MPV 10.6       Neut # 13.63       Neut % 78.7       NITRITE UA Negative       nRBC 0.0       Blood, UA 1+       pH, UA 6.5       Platelet Count 321       Potassium 3.7       PROTEIN TOTAL 5.6       Protein, UA 3+       Urine Protein 716.1       RBC 3.76       RBC, UA 0-5       RDW 16.5       Sodium 140       Specific Gravity,UA 1.021       Specimen Outdate 07/07/2025 23:59       Squamous Epithelial Cells, UA None Seen       TSH 1.490       Uric Acid 4.0       URINE PROTEIN/CREATININE RATIO ([facility]) 4.9       Urobilinogen, UA Normal       WBC, UA 0-5       WBC 17.33             I have personallly reviewed all pertinent lab results from the last 24 hours.    Diagnostic Results:  CXR:  Impression:  1. There is dense hazy opacity in the perihilar regions bilaterally which extends to the lung bases bilaterally obscuring portions of both heart borders. There is some patchy hazy opacity in the right upper lobe with the left upper lobe appearing clear. These findings are suggestive of multi lobar pneumonia. Correlate with clinical and laboratory findings and recommend continued interval follow-up to full resolution as indicated

## 2025-07-05 NOTE — HPI
34 yr  who delivered 25 via RLTCS presents with shortness of breath.  Pt was discharged on 7/3/25.  Pt denies HA, visual changes or RUQ pain.  Pt states no complications with pregnancy or immediately after delivery.

## 2025-07-05 NOTE — HOSPITAL COURSE
Pt noted to have low O2 saturation between 88-92%.  Pt's breathing was clearly labored.  Pt had mildly elevated BP's.  Pre-e labs were positive for PC ratio increased from 1.1 on 7/1/25 to 4.9.  Chest Xray suggestive of pneumonia.

## 2025-07-05 NOTE — PLAN OF CARE
Problem: Adult Inpatient Plan of Care  Goal: Plan of Care Review  Outcome: Progressing  Goal: Patient-Specific Goal (Individualized)  Outcome: Progressing  Goal: Absence of Hospital-Acquired Illness or Injury  Outcome: Progressing  Goal: Optimal Comfort and Wellbeing  Outcome: Progressing  Goal: Readiness for Transition of Care  Outcome: Progressing     Problem:  Fall Injury Risk  Goal: Absence of Fall, Infant Drop and Related Injury  Outcome: Progressing     Problem: Wound  Goal: Optimal Coping  Outcome: Progressing  Goal: Optimal Functional Ability  Outcome: Progressing  Goal: Absence of Infection Signs and Symptoms  Outcome: Progressing  Goal: Improved Oral Intake  Outcome: Progressing  Goal: Optimal Pain Control and Function  Outcome: Progressing  Goal: Skin Health and Integrity  Outcome: Progressing  Goal: Optimal Wound Healing  Outcome: Progressing

## 2025-07-05 NOTE — NURSING
NOLAN Santizo RNC assume care of patient at 0013. Patient on 2.5L O2.  Hagen placed at 0017 by BASILIA Escoto RN.  Mag Bolus began at 0022 c verification with BASILIA Escoto RN.  RN increased O2 from 2.5L to 3@0024  Increased O2 to 4.5L@0026  Increased O2 to 5L@ 0031  Assessment preformed, NOLAN Santizo RNC notes RR 56, Breath sound posterior: Clear:NIRU, RUL/ Crackles noted RML/ course crackles noted and diminished sounds:LLL, RLL.  FIORDALIZA Neff RN called at 0035 to attend bedside.  FIORDALIZA Neff RN preforms assessment, unable to hear breath sounds, RR 48 @0038  Attempt to call Dr Han @ 0040. No answer  RN notes MD in triage across [facility]. RN request for MD to come eval patient at bedside ASAP.   MD at bedside for assessment and NOLAN Santizo RNC gives updated report and change of status.@0042  MD contacts Dr FAVIAN Salguero, requesting ICU internality to eval patient and request for transfer to ICU @ 0044.  Dr Hinojosa at bedside @ 0108. POC discussed. MD agrees to transfer. MD requesting stat CTA and echo in the morning. MD will place orders in chart.  Report given to AUDRA Segovia (ICU) @0122.  Patient transferred to radiology @0133.  ICU assumes care post CTA @0150

## 2025-07-05 NOTE — CONSULTS
Inpatient consult to Cardiology  Consult performed by: Kait Pitt FNP  Consult ordered by: Agusto Meyers MD  Reason for consult: Peripartum Cardiomyopathy        [facility]    Cardiology  Consult Note    Patient Name: Irene Resendez  MRN: [id number]  Admission Date: 7/4/2025  Hospital Length of Stay: 0 days  Code Status: Prior   Attending Provider: Dante Gan MD   Consulting Provider: KAROL Nicole  Primary Care Physician: Glenis, Primary Doctor  Principal Problem:Preeclampsia in postpartum period    Patient information was obtained from patient, past medical records, ER records, and primary team.     Subjective:   Chief Complaint/Reason for Consult: Peripartum Cardiomyopathy    HPI:   Ms. Resendez is a 34 year old female, unknown to [facility], who presented to the hospital with SOB at rest. Also reported orthopnea. Recent pregnancy with delivery by  C Section on 6.30.25, with tubal ligation. In the ED, patient was hypoxic, tachycardic, with . Required 5L NC. Was given IV MgSO4 4g loading dose and was given x1 dose IV lasix 20 mg. CXR with multifocal PNA vs congestion, labs notable for WBC 17, CMP unremarkable; no signs of end organ damage. Patient will be admitted to ICU due to acute respiratory failure in the setting of possible post-partum preeclampsia. [facility] consulted for HF Evaluation due to concern for peripartum cardiomyopathy.     PMH: Hyperthyroidism (Untreated)  PSH: Tubal Ligation, C Section, Tonsillectomy  Family History: Mother- Breast Cancer  Social History: Tobacco- Negative, Alcohol- Negative, Substance Abuse- Negative    Previous Cardiac Diagnostics:   CT Angiogram Chest (7.5.25):  There is a medium sized right pleural effusion and a small left pleural effusion with atelectasis of the adjacent lung segments. There are pronounced ground glass, nodular and patchy opacities scattered in both lungs. This is suggestive of multifocal possibly atypical  pneumonia. Correlate with clinical and laboratory findings as regards further evaluation and follow up.  No CT evidence of pulmonary embolism.    Review of patient's allergies indicates:   Allergen Reactions    Sulfa (sulfonamide antibiotics) Anaphylaxis     No current facility-administered medications on file prior to encounter.     Current Outpatient Medications on File Prior to Encounter   Medication Sig    ibuprofen (ADVIL,MOTRIN) 600 MG tablet Take 600 mg by mouth 3 (three) times daily.    oxyCODONE-acetaminophen (PERCOCET) 5-325 mg per tablet Take 1 tablet by mouth every 4 (four) hours as needed for Pain.     Review of Systems   Respiratory:  Negative for chest tightness and shortness of breath.    Cardiovascular:  Negative for chest pain.   All other systems reviewed and are negative.    Objective:     Vital Signs (Most Recent):  Temp: 97.9 °F (36.6 °C) (07/05/25 0403)  Pulse: (!) 117 (07/05/25 0533)  Resp: 17 (07/05/25 0533)  BP: 119/82 (07/05/25 0533)  SpO2: 97 % (07/05/25 0533) Vital Signs (24h Range):  Temp:  [97.9 °F (36.6 °C)-98.8 °F (37.1 °C)] 97.9 °F (36.6 °C)  Pulse:  [104-152] 117  Resp:  [17-48] 17  SpO2:  [82 %-100 %] 97 %  BP: (102-161)/() 119/82   Weight: 65.8 kg (145 lb)  Body mass index is 25.69 kg/m².  SpO2: 97 %       Intake/Output Summary (Last 24 hours) at 7/5/2025 0711  Last data filed at 7/5/2025 0536  Gross per 24 hour   Intake --   Output 1900 ml   Net -1900 ml     Lines/Drains/Airways       Drain  Duration                  Urethral Catheter 07/05/25 0017 Double-lumen;Non-latex 16 Fr. <1 day              Peripheral Intravenous Line  Duration             Peripheral IV 07/04/25 2101 20 G Posterior;Right Hand <1 day    Peripheral IV Single Lumen 07/05/25 0200 Anterior;Right Upper Arm <1 day                  Significant Labs:   Chemistries:   Recent Labs   Lab 06/30/25  0855 06/30/25  1339 07/04/25 2059 07/05/25  0417    138 140 137   K 3.9 4.0 3.7 3.6    107 104 100  "  CO2 25 24 26 25   BUN 7.4 6.3* 8.2 7.6   CREATININE 0.68 0.65 0.70 0.58   CALCIUM 8.4 8.2* 7.9* 7.8*   PROT 6.2* 5.8* 5.6* 6.7   BILITOT 0.4 0.2 0.5 0.4   ALKPHOS 163* 133 116 128   ALT 9 7 15 15   AST 15 16 16 16   MG  --   --   --  4.90*   PHOS  --   --   --  3.8   TROPONINI  --   --   --  0.140*        CBC/Anemia Labs: Coags:    Recent Labs   Lab 06/30/25  1339 07/04/25  2059 07/05/25  0417   WBC 14.65* 17.33* 19.29*   HGB 8.7* 8.5* 10.2*   HCT 29.0* 29.5* 34.9*    321 350   MCV 75.1* 78.5* 77.0*   RDW 15.0 16.5 16.9    No results for input(s): "PT", "INR", "APTT" in the last 168 hours.     Significant Imaging:  Imaging Results              X-Ray Chest PA And Lateral (Preliminary result)  Result time 07/04/25 22:17:21      Preliminary result by Yariel Woods MD (07/04/25 22:17:21)                   Narrative:    START OF REPORT:  Technique: PA and lateral views of the chest were performed.    Comparison: None.    Clinical History: Sob, recent pregnancy.    Findings:  Soft Tissues: Unremarkable.  Lines and Tubes: None.  Mediastinum: The cardiomediastinal silhouette is within normal limits.  Lungs: There is dense hazy opacity in the perihilar regions bilaterally which extends to the lung bases bilaterally obscuring portions of both heart borders. There is some patchy hazy opacity in the right upper lobe with the left upper lobe appearing clear.  Pleura: No pneumothorax or effusions are identified.  Bony Structures: The visualized bony structures appear unremarkable.      Impression:  1. There is dense hazy opacity in the perihilar regions bilaterally which extends to the lung bases bilaterally obscuring portions of both heart borders. There is some patchy hazy opacity in the right upper lobe with the left upper lobe appearing clear. These findings are suggestive of multi lobar pneumonia. Correlate with clinical and laboratory findings and recommend continued interval follow-up to full resolution as " indicated.  2. Details and other findings as noted above.                                      EKG:       Telemetry:  ST    Physical Exam  Vitals and nursing note reviewed.   Constitutional:       General: She is not in acute distress.  HENT:      Head: Normocephalic.      Mouth/Throat:      Mouth: Mucous membranes are moist.   Cardiovascular:      Rate and Rhythm: Regular rhythm. Tachycardia present.   Pulmonary:      Effort: Pulmonary effort is normal. No respiratory distress.   Skin:     General: Skin is warm and dry.   Neurological:      Mental Status: She is alert. Mental status is at baseline.   Psychiatric:         Behavior: Behavior normal.       Home Medications:   Medications Ordered Prior to Encounter[1]  Current Schedule Inpatient Medications:   azithromycin  500 mg Oral Daily    cefTRIAXone (Rocephin) IV (PEDS and ADULTS)  2 g Intravenous Q24H    enoxparin  40 mg Subcutaneous Q24H (prophylaxis, 1700)    furosemide (LASIX) injection  20 mg Intravenous Daily     Continuous Infusions:   magnesium sulfate in water  1 g/hr Intravenous Continuous 25 mL/hr at 07/05/25 0501 1 g/hr at 07/05/25 0501     Assessment:   Acute Heart Failure (Unspecified)    - Bilateral Pleural Effusions with Scattered Ground Glass Opacities Bilateral Lung Fields (CT Imaging)    - Lactate Normal   NSTEMI Type II due to Hypoxemic Respiratory Failure due to Pneumonia/Sepsis versus decompensated HF & Post Partum Pre Eclampsia   Acute Hypoxemic Respiratory Failure    - Requiring Supplemental Oxygen Support  Post Partum Pre Eclampsia  Sepsis    - Bilateral Pneumonia    - Leukocytosis   Sinus Tachycardia  Recent Pregnancy with C Section Delivery on 6.30.25  History of Hyperthyroidism    - TSH Normal  Anemia    Plan:   Continue Lasix 20 Mg IVP Daily  Check Echocardiogram  Antibiotic Management as per ICU Team  Will follow closely     Thank you for your consult.     KAROL Nicole  Cardiology  [facility]    Physician addendum:  I have seen and examined this patient as a split-shared visit with the BON d/t complicated medical management of above problems written in assessment and high acuity requiring physician expertise in medical decision-making. I performed the substantive portion of the history and exam. Above medical decision-making is also formulated by me.    Cardiovascular exam:  S1, S2  Lungs:  fine crackles at bases.  Extremities:  + edema bilaterally    Plan:  Echo showed LV function of 25%.  This could be postpartum cardiomyopathy versus cardiomyopathy related to sepsis/pneumonia.  We will do gentle diuresis.  Medications as above.  Continue supportive therapy.     Armen Jackson MD  Cardiologist         [1]   No current facility-administered medications on file prior to encounter.     Current Outpatient Medications on File Prior to Encounter   Medication Sig Dispense Refill    ibuprofen (ADVIL,MOTRIN) 600 MG tablet Take 600 mg by mouth 3 (three) times daily.      oxyCODONE-acetaminophen (PERCOCET) 5-325 mg per tablet Take 1 tablet by mouth every 4 (four) hours as needed for Pain.

## 2025-07-05 NOTE — ASSESSMENT & PLAN NOTE
Care Management Note: Psychosocial Assessment/support  (PICU/PEDS)    Reason for Referral/Presenting Problem: Needs assessment being done on this 17y.o. year old patient. Patients chart reviewed and history noted. CM met with patient and his guardian (Maternal Grandmother - Lisa Landry [phone]) and his brother John Aggarwal IV [phone] to introduce role and offer freedom of choice. No preference indicated. Informants: CM met with patients guardian (maternal grandmother)  and responded to this workers questions, asking questions appropriately and answering questions in the same. Patients maternal grandmother is his guardian. Maternal grandmother is guardian with her  - maternal Grandfather Kay Cornell.  Patients mother involved in his life. Current Social History:  Aster Weeks is a 17 y.o.   AA or black male born at [address] [place] [address] admitted to [address] [place] [address] PICU with seizures - SEE HPI. He resides in [facility] with his maternal grandparents (guardians). Recent Losses:  T[facility] Ruiz)    Psychiatric HistorySuicidal/Homicidal Ideation: [employer] [facility])     Significant Medical Information: See chart notes    Substance Abuse History/Current Pattern of Use:  (UNK)    Legal or nursing home Concerns (CPS referral, Court paperwork etc.) : [facility]iz)     Positive Support Systems:  Uncle reports adequate social support system. Work/Educational History: Patient attends [employer]    Specialist (re: Pulmonologist): [employer] Ruiz)    DME/Nursing preference:  [facility] Ruiz)    Nebulizer at home ? No    Does patient have allergies that require an EPI pen at home? No    What type of transportation will be used upon discharge? Family    Financial Situation/Resources: [facility] MEDICAID/[place] [employer]    Preliminary Discharge Plan/Identified; Bedside assessment completed. Demographic and Primary Care Provider (PCP) verified and correct. Family @ bedside and asked questions.  CM will continue to follow discharge planning needs IV lasix 20 mg to be given x 1   for continuum of care. Brian Murillo RN, CRM    Care Management Interventions  PCP Verified by CM: Yes  Mode of Transport at Discharge:  Other (see comment)  MyChart Signup: No  Discharge Durable Medical Equipment: No  Physical Therapy Consult: No  Occupational Therapy Consult: No  Speech Therapy Consult: No  Current Support Network: Lives with Caregiver  Confirm Follow Up Transport: Family  Plan discussed with Pt/Family/Caregiver: Yes  Freedom of Choice Offered: Yes  Discharge Location  Discharge Placement: Home

## 2025-07-06 LAB
ALBUMIN SERPL-MCNC: 1.8 G/DL (ref 3.5–5)
ALBUMIN/GLOB SERPL: 0.5 RATIO (ref 1.1–2)
ALP SERPL-CCNC: 93 UNIT/L (ref 40–150)
ALT SERPL-CCNC: 11 UNIT/L (ref 0–55)
ANION GAP SERPL CALC-SCNC: 9 MEQ/L
AST SERPL-CCNC: 10 UNIT/L (ref 11–45)
BASOPHILS # BLD AUTO: 0.04 X10(3)/MCL
BASOPHILS NFR BLD AUTO: 0.3 %
BILIRUB SERPL-MCNC: 0.4 MG/DL
BUN SERPL-MCNC: 10.3 MG/DL (ref 7–18.7)
CALCIUM SERPL-MCNC: 7.4 MG/DL (ref 8.4–10.2)
CHLORIDE SERPL-SCNC: 102 MMOL/L (ref 98–107)
CO2 SERPL-SCNC: 27 MMOL/L (ref 22–29)
CREAT SERPL-MCNC: 0.59 MG/DL (ref 0.55–1.02)
CREAT/UREA NIT SERPL: 17
EOSINOPHIL # BLD AUTO: 0.16 X10(3)/MCL (ref 0–0.9)
EOSINOPHIL NFR BLD AUTO: 1.3 %
ERYTHROCYTE [DISTWIDTH] IN BLOOD BY AUTOMATED COUNT: 16.9 % (ref 11.5–17)
GFR SERPLBLD CREATININE-BSD FMLA CKD-EPI: >60 ML/MIN/1.73/M2
GLOBULIN SER-MCNC: 3.5 GM/DL (ref 2.4–3.5)
GLUCOSE SERPL-MCNC: 96 MG/DL (ref 74–100)
HCT VFR BLD AUTO: 30.1 % (ref 37–47)
HGB BLD-MCNC: 8.9 G/DL (ref 12–16)
IMM GRANULOCYTES # BLD AUTO: 0.23 X10(3)/MCL (ref 0–0.04)
IMM GRANULOCYTES NFR BLD AUTO: 1.8 %
LYMPHOCYTES # BLD AUTO: 1.78 X10(3)/MCL (ref 0.6–4.6)
LYMPHOCYTES NFR BLD AUTO: 14.2 %
MAGNESIUM SERPL-MCNC: 3 MG/DL (ref 1.6–2.6)
MCH RBC QN AUTO: 22.4 PG (ref 27–31)
MCHC RBC AUTO-ENTMCNC: 29.6 G/DL (ref 33–36)
MCV RBC AUTO: 75.6 FL (ref 80–94)
MONOCYTES # BLD AUTO: 0.95 X10(3)/MCL (ref 0.1–1.3)
MONOCYTES NFR BLD AUTO: 7.6 %
NEUTROPHILS # BLD AUTO: 9.36 X10(3)/MCL (ref 2.1–9.2)
NEUTROPHILS NFR BLD AUTO: 74.8 %
NRBC BLD AUTO-RTO: 0 %
PHOSPHATE SERPL-MCNC: 3.9 MG/DL (ref 2.3–4.7)
PLATELET # BLD AUTO: 327 X10(3)/MCL (ref 130–400)
PMV BLD AUTO: 10.1 FL (ref 7.4–10.4)
POTASSIUM SERPL-SCNC: 3.3 MMOL/L (ref 3.5–5.1)
PROT SERPL-MCNC: 5.3 GM/DL (ref 6.4–8.3)
RBC # BLD AUTO: 3.98 X10(6)/MCL (ref 4.2–5.4)
SODIUM SERPL-SCNC: 138 MMOL/L (ref 136–145)
TROPONIN I SERPL-MCNC: 0.08 NG/ML (ref 0–0.04)
WBC # BLD AUTO: 12.52 X10(3)/MCL (ref 4.5–11.5)

## 2025-07-06 PROCEDURE — 25000003 PHARM REV CODE 250: Performed by: INTERNAL MEDICINE

## 2025-07-06 PROCEDURE — 25000003 PHARM REV CODE 250: Performed by: NURSE PRACTITIONER

## 2025-07-06 PROCEDURE — 96376 TX/PRO/DX INJ SAME DRUG ADON: CPT

## 2025-07-06 PROCEDURE — 80053 COMPREHEN METABOLIC PANEL: CPT

## 2025-07-06 PROCEDURE — 63600175 PHARM REV CODE 636 W HCPCS: Performed by: INTERNAL MEDICINE

## 2025-07-06 PROCEDURE — 85025 COMPLETE CBC W/AUTO DIFF WBC: CPT

## 2025-07-06 PROCEDURE — 96366 THER/PROPH/DIAG IV INF ADDON: CPT

## 2025-07-06 PROCEDURE — 11000001 HC ACUTE MED/SURG PRIVATE ROOM

## 2025-07-06 PROCEDURE — 36415 COLL VENOUS BLD VENIPUNCTURE: CPT

## 2025-07-06 PROCEDURE — 84484 ASSAY OF TROPONIN QUANT: CPT

## 2025-07-06 PROCEDURE — 84100 ASSAY OF PHOSPHORUS: CPT

## 2025-07-06 PROCEDURE — 83735 ASSAY OF MAGNESIUM: CPT

## 2025-07-06 PROCEDURE — 63600175 PHARM REV CODE 636 W HCPCS

## 2025-07-06 PROCEDURE — 83880 ASSAY OF NATRIURETIC PEPTIDE: CPT | Performed by: INTERNAL MEDICINE

## 2025-07-06 PROCEDURE — 63700000 PHARM REV CODE 250 ALT 637 W/O HCPCS

## 2025-07-06 RX ORDER — CARVEDILOL 3.12 MG/1
6.25 TABLET ORAL 2 TIMES DAILY
Status: DISCONTINUED | OUTPATIENT
Start: 2025-07-06 | End: 2025-07-07

## 2025-07-06 RX ORDER — LISINOPRIL 2.5 MG/1
2.5 TABLET ORAL DAILY
Status: DISCONTINUED | OUTPATIENT
Start: 2025-07-06 | End: 2025-07-06

## 2025-07-06 RX ORDER — POTASSIUM CHLORIDE 20 MEQ/1
40 TABLET, EXTENDED RELEASE ORAL ONCE
Status: COMPLETED | OUTPATIENT
Start: 2025-07-06 | End: 2025-07-06

## 2025-07-06 RX ADMIN — PIPERACILLIN SODIUM AND TAZOBACTAM SODIUM 4.5 G: 4; .5 INJECTION, POWDER, LYOPHILIZED, FOR SOLUTION INTRAVENOUS at 02:07

## 2025-07-06 RX ADMIN — AZITHROMYCIN DIHYDRATE 500 MG: 250 TABLET ORAL at 08:07

## 2025-07-06 RX ADMIN — POTASSIUM BICARBONATE 25 MEQ: 977.5 TABLET, EFFERVESCENT ORAL at 03:07

## 2025-07-06 RX ADMIN — ENOXAPARIN SODIUM 40 MG: 40 INJECTION SUBCUTANEOUS at 05:07

## 2025-07-06 RX ADMIN — FUROSEMIDE 20 MG: 10 INJECTION, SOLUTION INTRAMUSCULAR; INTRAVENOUS at 08:07

## 2025-07-06 RX ADMIN — CARVEDILOL 6.25 MG: 3.12 TABLET, FILM COATED ORAL at 09:07

## 2025-07-06 RX ADMIN — POTASSIUM CHLORIDE 40 MEQ: 1500 TABLET, EXTENDED RELEASE ORAL at 06:07

## 2025-07-06 RX ADMIN — LISINOPRIL 2.5 MG: 2.5 TABLET ORAL at 12:07

## 2025-07-06 NOTE — PROGRESS NOTES
Pulmonary & Critical Care Medicine   Progress Note      Presenting History/HPI:  34 year old female with PMHx hyperthyroidism (untreated),  recently had  delivery ~5 days prior with tubal ligation as well; was uneventful and delivered via elective C section with no complications. Patient reports that since early this AM, she has been increasingly short of breath unassociated with exertion. Patient reports that her SOB is also mildly worsened with lying flat as well. Has also complained of R shoulder pain for this same period. Patient has never had these symptoms before at previous pregnancy. Also reports that her incision has been healing well and that her post-op pain is mostly controlled. Denies blurry vision, fevers, N/V/D, abd pain, headaches, chest pain, seizures. Also denies significant vaginal bleeding, or dysuria. Upon presentation to ED, patient was hypoxic, tachycardic, with . Required 5L NC. Was given IV MgSO4 4g loading dose and was given x1 dose IV lasix 20 mg. Patient reportedly feels mildly improved after these interventions. CXR with multifocal PNA vs congestion, labs notable for WBC 17, CMP unremarkable; no signs of end organ damage. Patient will be admitted to ICU due to acute respiratory failure in the setting of possible post-partum preeclampsia.       Interval History:  No major issues or changes overnight   -patient seen sitting upright in bed without any complaints of shortness a breath cough sputum production fevers chills sweats chest pain headaches dizziness syncope nausea vomiting  -she states that she feels better this morning in his asking when she can go home  -urine output of 1.950 L over the past 24 hours      Scheduled Medications:    carvediloL  6.25 mg Oral BID    enoxparin  40 mg Subcutaneous Q24H (prophylaxis, 1700)       PRN Medications:     Current Facility-Administered Medications:     acetaminophen, 650 mg, Oral, Q6H PRN    calcium gluconate, 1 g,  Intravenous, PRN    hydrALAZINE, 10 mg, Intravenous, Once PRN    HYDROcodone-acetaminophen, 1 tablet, Oral, Q6H PRN    labetalol, 20 mg, Intravenous, Once PRN    labetalol, 40 mg, Intravenous, Once PRN    labetalol, 80 mg, Intravenous, Once PRN    polyethylene glycol, 17 g, Oral, BID PRN    senna-docusate, 1 tablet, Oral, Daily PRN      Infusions:          Fluid Balance:     Intake/Output Summary (Last 24 hours) at 7/6/2025 1256  Last data filed at 7/6/2025 0628  Gross per 24 hour   Intake 281.5 ml   Output 850 ml   Net -568.5 ml         Vital Signs:   Vitals:    07/06/25 1111   BP:    Pulse:    Resp:    Temp: 98.2 °F (36.8 °C)         Physical Exam  Vitals and nursing note reviewed.   Constitutional:       General: She is not in acute distress.     Appearance: Normal appearance. She is not ill-appearing or toxic-appearing.   HENT:      Head: Normocephalic and atraumatic.      Right Ear: External ear normal.      Left Ear: External ear normal.      Nose: Nose normal.      Mouth/Throat:      Mouth: Mucous membranes are moist.      Pharynx: Oropharynx is clear. No oropharyngeal exudate or posterior oropharyngeal erythema.   Eyes:      General: No scleral icterus.     Extraocular Movements: Extraocular movements intact.      Conjunctiva/sclera: Conjunctivae normal.      Pupils: Pupils are equal, round, and reactive to light.   Neck:      Vascular: No carotid bruit.   Cardiovascular:      Rate and Rhythm: Normal rate and regular rhythm.      Pulses: Normal pulses.      Heart sounds: Normal heart sounds. No murmur heard.     No friction rub. No gallop.   Pulmonary:      Effort: Pulmonary effort is normal. No respiratory distress.      Breath sounds: Normal breath sounds. No wheezing, rhonchi or rales.   Abdominal:      General: Abdomen is flat. Bowel sounds are normal. There is no distension.      Palpations: Abdomen is soft.      Tenderness: There is no abdominal tenderness. There is no guarding or rebound.       "Comments: Low abdominal incision clean and dry   Genitourinary:     Comments: deferred  Musculoskeletal:         General: No swelling or deformity. Normal range of motion.      Cervical back: Normal range of motion and neck supple. No rigidity or tenderness.   Lymphadenopathy:      Cervical: No cervical adenopathy.   Skin:     General: Skin is warm and dry.      Capillary Refill: Capillary refill takes less than 2 seconds.      Coloration: Skin is not jaundiced.      Findings: No bruising, lesion or rash.   Neurological:      General: No focal deficit present.      Mental Status: She is alert and oriented to person, place, and time. Mental status is at baseline.      Cranial Nerves: No cranial nerve deficit.      Sensory: No sensory deficit.      Motor: No weakness.   Psychiatric:         Mood and Affect: Mood normal.           Ventilator Settings         Laboratory Studies:   No results for input(s): "PH", "PCO2", "PO2", "HCO3", "POCSATURATED", "BE" in the last 24 hours.  Recent Labs   Lab 07/06/25  0320   WBC 12.52*   RBC 3.98*   HGB 8.9*   HCT 30.1*      MCV 75.6*   MCH 22.4*   MCHC 29.6*     Recent Labs   Lab 07/06/25  0320      K 3.3*      CO2 27   BUN 10.3   CREATININE 0.59   CALCIUM 7.4*   MG 3.00*         Microbiology Data:   Microbiology Results (last 7 days)       Procedure Component Value Units Date/Time    Blood Culture [[id number]]  (Normal) Collected: 07/05/25 0710    Order Status: Completed Specimen: Blood from Hand, Left Updated: 07/06/25 1101     Blood Culture No Growth At 24 Hours    Blood Culture [[id number]]  (Normal) Collected: 07/05/25 0417    Order Status: Completed Specimen: Blood from Antecubital, Left Updated: 07/06/25 0500     Blood Culture No Growth At 24 Hours    Respiratory Culture [[id number]]     Order Status: Sent Specimen: Sputum               Imaging:   X-Ray Chest PA And Lateral  Narrative: EXAMINATION:  XR CHEST PA AND LATERAL    CLINICAL HISTORY:  shortness of " breath;    TECHNIQUE:  PA and lateral chest radiographs    COMPARISON:  None.    FINDINGS:  The heart is normal in size.  There are bilateral interstitial airspace opacities.  There is no pleural effusion or visible pneumothorax.  Impression: Bilateral interstitial airspace opacities    Electronically signed by: Becky Lentz  Date:    07/05/2025  Time:    12:37  Echo Saline Bubble? No    Left Ventricle: The left ventricle is normal in size. Moderate global   hypokinesis present. There is severely reduced systolic function with a   visually estimated ejection fraction of 25%.    Right Ventricle: The right ventricle is small/ compressed in size   measuring 2.0 cm. Systolic function is moderately reduced.    Aortic Valve: There is mild aortic regurgitation.    Mitral Valve: There is mild regurgitation.    Pericardium: There is a small circumferential effusion. No indication   of cardiac tamponade. Evidence includes normal LV size, no septal bounce,   no IVC dilation, no chamber collapse.  CTA Chest Non-Coronary (PE Studies)  Narrative: EXAMINATION:  CTA CHEST NON CORONARY (PE STUDIES)    CLINICAL HISTORY:  Pulmonary embolism (PE) suspected, unknown D-dimer;    TECHNIQUE:  Helical-acquisition CT images were obtained following the intravenous administration of iodine-based contrast media.    The post-contrast images were timed to coincide with arterial opacification for purpose of CT angiography.    Multiplanar reconstructions, to include MIP and volume-rendered (3D) images, were accomplished by the CT technologist at a separate workstation and pushed to PACS for physician review.    Total DLP (mGy-cm) 277 (value may include radiation due to concomitantly performed CT imaging)    Automated tube current modulation and/or weight-based exposure dosing is utilized, when appropriate, to reach lowest reasonably achievable exposure rate.    COMPARISON:  None    FINDINGS:  The heart is normal in size.  The RV to LV ratio is  less than 1.  There is no pericardial effusion.  There is no pulmonary embolism identified.    There are small bilateral pleural effusions with dependent atelectasis.  There are bilateral ground-glass airspace opacities with septal thickening.    There are no acute findings in the upper abdomen.    There is no acute bony abnormality.  Impression: 1. No pulmonary embolism identified.  2. Small bilateral pleural effusions with findings suggestive of pulmonary edema.  Minor change from the overnight interpretation.    Electronically signed by: Becky Lentz  Date:    07/05/2025  Time:    10:45          Assessment and Plan    Assessment:  Preeclampsia  Peripartum/postpartum cardiomyopathy with moderate global hypokinesis and left ventricular ejection fraction of 25%  Acute hypoxemic respiratory failure secondary to the above   Hypokalemia         Plan:  -will start the patient on carvedilol 6.25 mg b.i.d. with underlying cardiomyopathy with reduced systolic function, the patient wishes to continue breast-feeding , we will avoid Ace/Arb medications  -she will need re-evaluation with 2D echo etc. to determine if cardiac function is returning to baseline  -will finish with diuretics after today's dose and re-evaluate later to determine if she needs further diuretic regimen   -replace potassium   -transfer out of ICU level care    Further management per OB hospitalist        Dante Gan MD  7/6/2025  Pulmonology/Critical Care

## 2025-07-06 NOTE — PROGRESS NOTES
[facility]    Cardiology  Progress Note    Patient Name: Irene Resendez  MRN: [id number]  Admission Date: 7/4/2025  Hospital Length of Stay: 0 days  Code Status: Prior   Attending Provider: Dante Gan MD   Consulting Provider: KAROL Nicole  Primary Care Physician: Glenis, Primary Doctor  Principal Problem:Preeclampsia in postpartum period    Patient information was obtained from patient, past medical records, ER records, and primary team.     Subjective:   Chief Complaint/Reason for Consult: Peripartum Cardiomyopathy    HPI:   Ms. Resendez is a 34 year old female, unknown to [facility], who presented to the hospital with SOB at rest. Also reported orthopnea. Recent pregnancy with delivery by  C Section on 6.30.25, with tubal ligation. In the ED, patient was hypoxic, tachycardic, with . Required 5L NC. Was given IV MgSO4 4g loading dose and was given x1 dose IV lasix 20 mg. CXR with multifocal PNA vs congestion, labs notable for WBC 17, CMP unremarkable; no signs of end organ damage. Patient will be admitted to ICU due to acute respiratory failure in the setting of possible post-partum preeclampsia. [facility] consulted for HF Evaluation due to concern for peripartum cardiomyopathy.     Hospital Course:  7.6.25: NAD Noted. ST on Tele. Echo results reviewed with the patient.     PMH: Hyperthyroidism (Untreated)  PSH: Tubal Ligation, C Section, Tonsillectomy  Family History: Mother- Breast Cancer  Social History: Tobacco- Negative, Alcohol- Negative, Substance Abuse- Negative    Previous Cardiac Diagnostics:   Echo (7.5.25):  Left Ventricle: The left ventricle is normal in size. Moderate global hypokinesis present. There is severely reduced systolic function with a visually estimated ejection fraction of 25%.  Right Ventricle: The right ventricle is small/ compressed in size measuring 2.0 cm. Systolic function is moderately reduced.  Aortic Valve: There is mild aortic  regurgitation.  Mitral Valve: There is mild regurgitation.  Pericardium: There is a small circumferential effusion. No indication of cardiac tamponade. Evidence includes normal LV size, no septal bounce, no IVC dilation, no chamber collapse.    CT Angiogram Chest (7.5.25):  There is a medium sized right pleural effusion and a small left pleural effusion with atelectasis of the adjacent lung segments. There are pronounced ground glass, nodular and patchy opacities scattered in both lungs. This is suggestive of multifocal possibly atypical pneumonia. Correlate with clinical and laboratory findings as regards further evaluation and follow up.  No CT evidence of pulmonary embolism.    Review of patient's allergies indicates:   Allergen Reactions    Sulfa (sulfonamide antibiotics) Anaphylaxis     No current facility-administered medications on file prior to encounter.     Current Outpatient Medications on File Prior to Encounter   Medication Sig    ibuprofen (ADVIL,MOTRIN) 600 MG tablet Take 600 mg by mouth 3 (three) times daily.    oxyCODONE-acetaminophen (PERCOCET) 5-325 mg per tablet Take 1 tablet by mouth every 4 (four) hours as needed for Pain.     Review of Systems   Respiratory:  Negative for chest tightness and shortness of breath.    Cardiovascular:  Negative for chest pain.   All other systems reviewed and are negative.    Objective:     Vital Signs (Most Recent):  Temp: 98.2 °F (36.8 °C) (07/06/25 0705)  Pulse: (!) 116 (07/06/25 0705)  Resp: (!) 22 (07/06/25 0705)  BP: (!) 128/98 (07/06/25 0705)  SpO2: 97 % (07/06/25 0705) Vital Signs (24h Range):  Temp:  [96.4 °F (35.8 °C)-98.2 °F (36.8 °C)] 98.2 °F (36.8 °C)  Pulse:  [104-133] 116  Resp:  [14-32] 22  SpO2:  [91 %-97 %] 97 %  BP: (119-147)/() 128/98   Weight: 65.8 kg (145 lb)  Body mass index is 25.69 kg/m².  SpO2: 97 %       Intake/Output Summary (Last 24 hours) at 7/6/2025 0918  Last data filed at 7/6/2025 0628  Gross per 24 hour   Intake 1066.99 ml  "  Output 1700 ml   Net -633.01 ml     Lines/Drains/Airways       Drain  Duration                  Urethral Catheter 07/05/25 0017 Double-lumen;Non-latex 16 Fr. 1 day              Peripheral Intravenous Line  Duration             Peripheral IV 07/04/25 2101 20 G Posterior;Right Hand 1 day    Peripheral IV Single Lumen 07/05/25 0200 Anterior;Right Upper Arm 1 day                  Significant Labs:   Chemistries:   Recent Labs   Lab 06/30/25  0855 06/30/25  1339 07/04/25 2059 07/05/25  0417 07/05/25  0927 07/05/25  1505 07/05/25  2103 07/06/25  0320    138 140 137  --   --   --  138   K 3.9 4.0 3.7 3.6  --   --   --  3.3*    107 104 100  --   --   --  102   CO2 25 24 26 25  --   --   --  27   BUN 7.4 6.3* 8.2 7.6  --   --   --  10.3   CREATININE 0.68 0.65 0.70 0.58  --   --   --  0.59   CALCIUM 8.4 8.2* 7.9* 7.8*  --   --   --  7.4*   PROT 6.2* 5.8* 5.6* 6.7  --   --   --  5.3*   BILITOT 0.4 0.2 0.5 0.4  --   --   --  0.4   ALKPHOS 163* 133 116 128  --   --   --  93   ALT 9 7 15 15  --   --   --  11   AST 15 16 16 16  --   --   --  10*   MG  --   --   --  4.90*  --  5.00*  --  3.00*   PHOS  --   --   --  3.8  --   --   --  3.9   TROPONINI  --   --   --  0.140* 0.116* 0.112* 0.098* 0.084*        CBC/Anemia Labs: Coags:    Recent Labs   Lab 07/04/25 2059 07/05/25 0417 07/06/25  0320   WBC 17.33* 19.29* 12.52*   HGB 8.5* 10.2* 8.9*   HCT 29.5* 34.9* 30.1*    350 327   MCV 78.5* 77.0* 75.6*   RDW 16.5 16.9 16.9    No results for input(s): "PT", "INR", "APTT" in the last 168 hours.     Significant Imaging:  Imaging Results              X-Ray Chest PA And Lateral (Final result)  Result time 07/05/25 12:37:27      Final result by Becky Lentz MD (07/05/25 12:37:27)                   Impression:      Bilateral interstitial airspace opacities      Electronically signed by: Becky Lentz  Date:    07/05/2025  Time:    12:37               Narrative:    EXAMINATION:  XR CHEST PA AND " LATERAL    CLINICAL HISTORY:  shortness of breath;    TECHNIQUE:  PA and lateral chest radiographs    COMPARISON:  None.    FINDINGS:  The heart is normal in size.  There are bilateral interstitial airspace opacities.  There is no pleural effusion or visible pneumothorax.                        Preliminary result by Yariel Woods MD (07/04/25 22:17:21)                   Impression:    1. There is dense hazy opacity in the perihilar regions bilaterally which extends to the lung bases bilaterally obscuring portions of both heart borders. There is some patchy hazy opacity in the right upper lobe with the left upper lobe appearing clear. These findings are suggestive of multi lobar pneumonia. Correlate with clinical and laboratory findings and recommend continued interval follow-up to full resolution as indicated.  2. Details and other findings as noted above.               Narrative:    START OF REPORT:  Technique: PA and lateral views of the chest were performed.    Comparison: None.    Clinical History: Sob, recent pregnancy.    Findings:  Soft Tissues: Unremarkable.  Lines and Tubes: None.  Mediastinum: The cardiomediastinal silhouette is within normal limits.  Lungs: There is dense hazy opacity in the perihilar regions bilaterally which extends to the lung bases bilaterally obscuring portions of both heart borders. There is some patchy hazy opacity in the right upper lobe with the left upper lobe appearing clear.  Pleura: No pneumothorax or effusions are identified.  Bony Structures: The visualized bony structures appear unremarkable.                                      Telemetry:  ST    Physical Exam  Vitals and nursing note reviewed.   Constitutional:       General: She is not in acute distress.  HENT:      Head: Normocephalic.      Mouth/Throat:      Mouth: Mucous membranes are moist.   Cardiovascular:      Rate and Rhythm: Regular rhythm. Tachycardia present.   Pulmonary:      Effort: Pulmonary effort is  normal. No respiratory distress.   Skin:     General: Skin is warm and dry.   Neurological:      Mental Status: She is alert. Mental status is at baseline.   Psychiatric:         Behavior: Behavior normal.       Home Medications:   Medications Ordered Prior to Encounter[1]  Current Schedule Inpatient Medications:   azithromycin  500 mg Oral Daily    enoxparin  40 mg Subcutaneous Q24H (prophylaxis, 1700)    furosemide (LASIX) injection  20 mg Intravenous Daily    piperacillin-tazobactam (Zosyn) IV (PEDS and ADULTS) (extended infusion is not appropriate)  4.5 g Intravenous Q8H     Continuous Infusions:   magnesium sulfate in water  1 g/hr Intravenous Continuous 25 mL/hr at 07/05/25 1526 1 g/hr at 07/05/25 1526     Assessment:   Acute Systolic Heart Failure    - Bilateral Pleural Effusions with Scattered Ground Glass Opacities Bilateral Lung Fields (CT Imaging)    - Lactate Normal   NSTEMI Type II due to Hypoxemic Respiratory Failure due to Pneumonia/Sepsis versus decompensated HF & Post Partum Pre Eclampsia   Hyacinth-partum Cardiomyopathy (New Onset)    - EF 25% (Echo 7.5.25)  Acute Hypoxemic Respiratory Failure (Resolved)    - Requiring Supplemental Oxygen Support  Post Partum Pre Eclampsia  Sepsis    - Bilateral Pneumonia    - Leukocytosis   Pericardial Effusion (Small) with no evidence of Cardiac Tamponade (Echo 7.5.25)  Sinus Tachycardia  Recent Pregnancy with C Section Delivery on 6.30.25  History of Hyperthyroidism    - TSH Normal  Anemia  Hypokalemia    Plan:   Start Coreg 6.25 Mg PO BID & Lisinopril 2.5 Mg PO Daily  Replete K+ (Done)  Start Lasix 20 Mg PO Daily  Patient status Post Tubal Ligation. Educated on importance of avoiding breast feeding (On ACEI).  Will need close outpatient cardiology following. Will continue to follow.    Kait Pitt, KAROL  Cardiology  [facility]   Physician addendum:  I have seen and examined this patient as a split-shared visit with the BON d/t  complicated medical management of above problems written in assessment and high acuity requiring physician expertise in medical decision-making. I performed the substantive portion of the history and exam. Above medical decision-making is also formulated by me.    Cardiovascular exam:  S1, S2  Lungs:  fine crackles at bases.  Extremities:  + trace edema bilaterally    Plan:  Medications as above.  Continue supportive therapy.  Discussed with the patient and counseled regarding avoiding breastfeeding and no future pregnancies.    Armen Jackson MD  Cardiologist         [1]   No current facility-administered medications on file prior to encounter.     Current Outpatient Medications on File Prior to Encounter   Medication Sig Dispense Refill    ibuprofen (ADVIL,MOTRIN) 600 MG tablet Take 600 mg by mouth 3 (three) times daily.      oxyCODONE-acetaminophen (PERCOCET) 5-325 mg per tablet Take 1 tablet by mouth every 4 (four) hours as needed for Pain.

## 2025-07-06 NOTE — PLAN OF CARE
Problem: Wound  Goal: Optimal Coping  Outcome: Progressing  Goal: Optimal Functional Ability  Outcome: Progressing  Goal: Absence of Infection Signs and Symptoms  Outcome: Progressing  Goal: Improved Oral Intake  Outcome: Progressing

## 2025-07-07 VITALS
RESPIRATION RATE: 20 BRPM | WEIGHT: 145 LBS | TEMPERATURE: 98 F | SYSTOLIC BLOOD PRESSURE: 124 MMHG | DIASTOLIC BLOOD PRESSURE: 81 MMHG | OXYGEN SATURATION: 97 % | HEART RATE: 125 BPM | HEIGHT: 63 IN | BODY MASS INDEX: 25.69 KG/M2

## 2025-07-07 PROBLEM — Z98.891 PREVIOUS CESAREAN SECTION: Status: RESOLVED | Noted: 2025-07-03 | Resolved: 2025-07-07

## 2025-07-07 PROBLEM — J98.4: Status: RESOLVED | Noted: 2025-07-05 | Resolved: 2025-07-07

## 2025-07-07 PROBLEM — Z34.90 PREGNANCY: Status: ACTIVE | Noted: 2025-07-07

## 2025-07-07 LAB
ALBUMIN SERPL-MCNC: 1.9 G/DL (ref 3.5–5)
ALBUMIN/GLOB SERPL: 0.5 RATIO (ref 1.1–2)
ALP SERPL-CCNC: 95 UNIT/L (ref 40–150)
ALT SERPL-CCNC: 22 UNIT/L (ref 0–55)
ANION GAP SERPL CALC-SCNC: 8 MEQ/L
AST SERPL-CCNC: 17 UNIT/L (ref 11–45)
BASOPHILS # BLD AUTO: 0.04 X10(3)/MCL
BASOPHILS NFR BLD AUTO: 0.3 %
BILIRUB SERPL-MCNC: 0.4 MG/DL
BUN SERPL-MCNC: 10.1 MG/DL (ref 7–18.7)
CALCIUM SERPL-MCNC: 8.1 MG/DL (ref 8.4–10.2)
CHLORIDE SERPL-SCNC: 105 MMOL/L (ref 98–107)
CO2 SERPL-SCNC: 25 MMOL/L (ref 22–29)
CREAT SERPL-MCNC: 0.61 MG/DL (ref 0.55–1.02)
CREAT/UREA NIT SERPL: 17
EOSINOPHIL # BLD AUTO: 0.16 X10(3)/MCL (ref 0–0.9)
EOSINOPHIL NFR BLD AUTO: 1 %
ERYTHROCYTE [DISTWIDTH] IN BLOOD BY AUTOMATED COUNT: 17.1 % (ref 11.5–17)
GFR SERPLBLD CREATININE-BSD FMLA CKD-EPI: >60 ML/MIN/1.73/M2
GLOBULIN SER-MCNC: 3.5 GM/DL (ref 2.4–3.5)
GLUCOSE SERPL-MCNC: 99 MG/DL (ref 74–100)
HCT VFR BLD AUTO: 27.9 % (ref 37–47)
HGB BLD-MCNC: 8.4 G/DL (ref 12–16)
IMM GRANULOCYTES # BLD AUTO: 0.3 X10(3)/MCL (ref 0–0.04)
IMM GRANULOCYTES NFR BLD AUTO: 1.9 %
LYMPHOCYTES # BLD AUTO: 1.37 X10(3)/MCL (ref 0.6–4.6)
LYMPHOCYTES NFR BLD AUTO: 8.6 %
MAGNESIUM SERPL-MCNC: 1.9 MG/DL (ref 1.6–2.6)
MCH RBC QN AUTO: 22.9 PG (ref 27–31)
MCHC RBC AUTO-ENTMCNC: 30.1 G/DL (ref 33–36)
MCV RBC AUTO: 76 FL (ref 80–94)
MONOCYTES # BLD AUTO: 0.9 X10(3)/MCL (ref 0.1–1.3)
MONOCYTES NFR BLD AUTO: 5.6 %
NEUTROPHILS # BLD AUTO: 13.17 X10(3)/MCL (ref 2.1–9.2)
NEUTROPHILS NFR BLD AUTO: 82.6 %
NRBC BLD AUTO-RTO: 0 %
PHOSPHATE SERPL-MCNC: 3.8 MG/DL (ref 2.3–4.7)
PLATELET # BLD AUTO: 289 X10(3)/MCL (ref 130–400)
PMV BLD AUTO: 10.4 FL (ref 7.4–10.4)
POTASSIUM SERPL-SCNC: 3.8 MMOL/L (ref 3.5–5.1)
PROT SERPL-MCNC: 5.4 GM/DL (ref 6.4–8.3)
RBC # BLD AUTO: 3.67 X10(6)/MCL (ref 4.2–5.4)
SODIUM SERPL-SCNC: 138 MMOL/L (ref 136–145)
WBC # BLD AUTO: 15.94 X10(3)/MCL (ref 4.5–11.5)

## 2025-07-07 PROCEDURE — 84100 ASSAY OF PHOSPHORUS: CPT

## 2025-07-07 PROCEDURE — 36415 COLL VENOUS BLD VENIPUNCTURE: CPT

## 2025-07-07 PROCEDURE — 85025 COMPLETE CBC W/AUTO DIFF WBC: CPT

## 2025-07-07 PROCEDURE — 80053 COMPREHEN METABOLIC PANEL: CPT

## 2025-07-07 PROCEDURE — 25000003 PHARM REV CODE 250: Performed by: NURSE PRACTITIONER

## 2025-07-07 PROCEDURE — 25000003 PHARM REV CODE 250: Performed by: INTERNAL MEDICINE

## 2025-07-07 PROCEDURE — 25000003 PHARM REV CODE 250

## 2025-07-07 PROCEDURE — 83735 ASSAY OF MAGNESIUM: CPT

## 2025-07-07 RX ORDER — CARVEDILOL 12.5 MG/1
12.5 TABLET ORAL 2 TIMES DAILY WITH MEALS
COMMUNITY

## 2025-07-07 RX ORDER — CARVEDILOL 12.5 MG/1
12.5 TABLET ORAL 2 TIMES DAILY
Status: DISCONTINUED | OUTPATIENT
Start: 2025-07-07 | End: 2025-07-08 | Stop reason: HOSPADM

## 2025-07-07 RX ORDER — MAGNESIUM SULFATE HEPTAHYDRATE 40 MG/ML
2 INJECTION, SOLUTION INTRAVENOUS ONCE
Status: DISCONTINUED | OUTPATIENT
Start: 2025-07-07 | End: 2025-07-07

## 2025-07-07 RX ORDER — LISINOPRIL 2.5 MG/1
2.5 TABLET ORAL DAILY
COMMUNITY

## 2025-07-07 RX ORDER — POTASSIUM CHLORIDE 20 MEQ/1
20 TABLET, EXTENDED RELEASE ORAL ONCE
Status: COMPLETED | OUTPATIENT
Start: 2025-07-07 | End: 2025-07-07

## 2025-07-07 RX ORDER — LISINOPRIL 2.5 MG/1
2.5 TABLET ORAL DAILY
Status: DISCONTINUED | OUTPATIENT
Start: 2025-07-07 | End: 2025-07-08 | Stop reason: HOSPADM

## 2025-07-07 RX ORDER — CALCIUM GLUCONATE 98 MG/ML
1 INJECTION, SOLUTION INTRAVENOUS
OUTPATIENT
Start: 2025-07-07

## 2025-07-07 RX ORDER — FUROSEMIDE 20 MG/1
20 TABLET ORAL DAILY
COMMUNITY

## 2025-07-07 RX ORDER — ACETAMINOPHEN 325 MG/1
650 TABLET ORAL EVERY 6 HOURS PRN
Refills: 0 | OUTPATIENT
Start: 2025-07-07

## 2025-07-07 RX ORDER — CARVEDILOL 12.5 MG/1
12.5 TABLET ORAL 2 TIMES DAILY
Qty: 180 TABLET | Refills: 3 | OUTPATIENT
Start: 2025-07-07 | End: 2026-07-07

## 2025-07-07 RX ORDER — CARVEDILOL 3.12 MG/1
6.25 TABLET ORAL ONCE
Status: COMPLETED | OUTPATIENT
Start: 2025-07-07 | End: 2025-07-07

## 2025-07-07 RX ADMIN — HYDROCODONE BITARTRATE AND ACETAMINOPHEN 1 TABLET: 5; 325 TABLET ORAL at 02:07

## 2025-07-07 RX ADMIN — POTASSIUM CHLORIDE 20 MEQ: 1500 TABLET, EXTENDED RELEASE ORAL at 10:07

## 2025-07-07 RX ADMIN — CARVEDILOL 6.25 MG: 3.12 TABLET, FILM COATED ORAL at 10:07

## 2025-07-07 RX ADMIN — LISINOPRIL 2.5 MG: 2.5 TABLET ORAL at 01:07

## 2025-07-07 RX ADMIN — CARVEDILOL 6.25 MG: 3.12 TABLET, FILM COATED ORAL at 01:07

## 2025-07-07 NOTE — DISCHARGE INSTRUCTIONS
Our goal at [facility] is to always give you outstanding care and exceptional service. You may receive a survey from [employer] by mail, text or e-mail in the next 24-48 hours asking about the care you received with us. The survey should only take 5-10 minutes to complete and is very important to us.     Your feedback provides us with a way to recognize our staff who work tirelessly to provide the best care! Also, your responses help us learn how to improve when your experience was below our aspiration of excellence. We are always looking for ways to improve your stay. We WILL use your feedback to continue making improvements to help us provide the highest quality care. We keep your personal information and feedback confidential. We appreciate your time completing this survey and can't wait to hear from you!!!    We look forward to your continued care with us! Thanks so much for choosing [facility] for your healthcare needs!

## 2025-07-07 NOTE — PLAN OF CARE
07/07/25 1104   Discharge Assessment   Assessment Type Discharge Planning Assessment   Confirmed/corrected address, phone number and insurance Yes   Confirmed Demographics Correct on Facesheet   Source of Information patient   Communicated YURI with patient/caregiver Date not available/Unable to determine   People in Home spouse;child(ximena), dependent   Name(s) of People in Home The patient lives with her : Samy Resendez: [phone] and her (3 year-old) son.   Facility Arrived From: Private residence: [place], [place].   Do you expect to return to your current living situation? Yes   Do you have help at home or someone to help you manage your care at home? Yes   Who are your caregiver(s) and their phone number(s)? The patient lives with her : Samy Resendez: [phone] and her (3 year-old) son.   Prior to hospitilization cognitive status: Alert/Oriented   Current cognitive status: Alert/Oriented   Walking or Climbing Stairs Difficulty no   Dressing/Bathing Difficulty no   Home Layout Able to live on 1st floor   Equipment Currently Used at Home none   Readmission within 30 days? Yes   Patient currently being followed by outpatient case management? No   Do you currently have service(s) that help you manage your care at home? No   Do you take prescription medications? Yes   Do you have prescription coverage? Yes  (Payor: [employer])   Do you have any problems affording any of your prescribed medications? No   Is the patient taking medications as prescribed? yes   Who is going to help you get home at discharge? : Samy Resendez: [phone]   How do you get to doctors appointments? family or friend will provide   Are you on dialysis? No   Do you take coumadin? No   Discharge Plan A Home with family   Discharge Plan B Home with family   DME Needed Upon Discharge  none   Discharge Plan discussed with: Patient   Transition of Care Barriers None   Physical  Activity   On average, how many days per week do you engage in moderate to strenuous exercise (like a brisk walk)? 5 days   On average, how many minutes do you engage in exercise at this level? 30 min   Financial Resource Strain   How hard is it for you to pay for the very basics like food, housing, medical care, and heating? Not very   Housing Stability   In the last 12 months, was there a time when you were not able to pay the mortgage or rent on time? N   At any time in the past 12 months, were you homeless or living in a shelter (including now)? N   Transportation Needs   In the past 12 months, has lack of transportation kept you from medical appointments or from getting medications? no   In the past 12 months, has lack of transportation kept you from meetings, work, or from getting things needed for daily living? No   Food Insecurity   Within the past 12 months, you worried that your food would run out before you got the money to buy more. Never true   Within the past 12 months, the food you bought just didn't last and you didn't have money to get more. Never true   Stress   Do you feel stress - tense, restless, nervous, or anxious, or unable to sleep at night because your mind is troubled all the time - these days? Only a littl   Social Isolation   How often do you feel lonely or isolated from those around you?  Rarely   Alcohol Use   Q1: How often do you have a drink containing alcohol? Never   Q2: How many drinks containing alcohol do you have on a typical day when you are drinking? None   Q3: How often do you have six or more drinks on one occasion? Never   [employer]   In the past 12 months has the electric, gas, oil, or water company threatened to shut off services in your home? No   Health Literacy   How often do you need to have someone help you when you read instructions, pamphlets, or other written material from your doctor or pharmacy? Never     : Samy Citty: [phone].  Note: delivered by   on: Monday and was discharged home however, experienced SOB and increased B/P & HR. She was re-admitted to [facility] on: 25.  PCP: None.  Pharmacy: [facility]).   DME: None.  Alcohol: None.  Tobacco: None.

## 2025-07-07 NOTE — PROGRESS NOTES
[facility]    Cardiology  Progress Note    Patient Name: Irene Resendez  MRN: [id number]  Admission Date: 7/4/2025  Hospital Length of Stay: 1 days  Code Status: Prior   Attending Provider: Vasquez Salguero MD   Consulting Provider: KAROL Nicole  Primary Care Physician: Glenis, Primary Doctor  Principal Problem:Preeclampsia in postpartum period    Patient information was obtained from patient, past medical records, ER records, and primary team.     Subjective:   Chief Complaint/Reason for Consult: Peripartum Cardiomyopathy    HPI:   Ms. Resendez is a 34 year old female, unknown to [facility], who presented to the hospital with SOB at rest. Also reported orthopnea. Recent pregnancy with delivery by  C Section on 6.30.25, with tubal ligation. In the ED, patient was hypoxic, tachycardic, with . Required 5L NC. Was given IV MgSO4 4g loading dose and was given x1 dose IV lasix 20 mg. CXR with multifocal PNA vs congestion, labs notable for WBC 17, CMP unremarkable; no signs of end organ damage. Patient will be admitted to ICU due to acute respiratory failure in the setting of possible post-partum preeclampsia. [facility] consulted for HF Evaluation due to concern for peripartum cardiomyopathy.     Hospital Course:  7.6.25: NAD Noted. ST on Tele. Echo results reviewed with the patient.   7.7.25: NAD Noted. ST on Tele. BP Controlled. - 1259 MLS/24 Hours. Lisinopril discontinued by someone as patient wishes to breast feed.     PMH: Hyperthyroidism (Untreated)  PSH: Tubal Ligation, C Section, Tonsillectomy  Family History: Mother- Breast Cancer  Social History: Tobacco- Negative, Alcohol- Negative, Substance Abuse- Negative    Previous Cardiac Diagnostics:   Echo (7.5.25):  Left Ventricle: The left ventricle is normal in size. Moderate global hypokinesis present. There is severely reduced systolic function with a visually estimated ejection fraction of 25%.  Right Ventricle: The right ventricle is  small/ compressed in size measuring 2.0 cm. Systolic function is moderately reduced.  Aortic Valve: There is mild aortic regurgitation.  Mitral Valve: There is mild regurgitation.  Pericardium: There is a small circumferential effusion. No indication of cardiac tamponade. Evidence includes normal LV size, no septal bounce, no IVC dilation, no chamber collapse.    CT Angiogram Chest (7.5.25):  There is a medium sized right pleural effusion and a small left pleural effusion with atelectasis of the adjacent lung segments. There are pronounced ground glass, nodular and patchy opacities scattered in both lungs. This is suggestive of multifocal possibly atypical pneumonia. Correlate with clinical and laboratory findings as regards further evaluation and follow up.  No CT evidence of pulmonary embolism.    Review of patient's allergies indicates:   Allergen Reactions    Sulfa (sulfonamide antibiotics) Anaphylaxis     No current facility-administered medications on file prior to encounter.     Current Outpatient Medications on File Prior to Encounter   Medication Sig    ibuprofen (ADVIL,MOTRIN) 600 MG tablet Take 600 mg by mouth 3 (three) times daily.    oxyCODONE-acetaminophen (PERCOCET) 5-325 mg per tablet Take 1 tablet by mouth every 4 (four) hours as needed for Pain.     Review of Systems   Respiratory:  Negative for chest tightness and shortness of breath.    Cardiovascular:  Negative for chest pain.   All other systems reviewed and are negative.    Objective:     Vital Signs (Most Recent):  Temp: 98.8 °F (37.1 °C) (07/07/25 0506)  Pulse: (!) 118 (07/07/25 0731)  Resp: 20 (07/07/25 0506)  BP: 128/89 (07/07/25 0731)  SpO2: 98 % (07/07/25 0731) Vital Signs (24h Range):  Temp:  [98.2 °F (36.8 °C)-99 °F (37.2 °C)] 98.8 °F (37.1 °C)  Pulse:  [113-132] 118  Resp:  [18-31] 20  SpO2:  [95 %-98 %] 98 %  BP: (120-139)/() 128/89   Weight: 65.8 kg (145 lb)  Body mass index is 25.69 kg/m².  SpO2: 98 %     No intake or output  "data in the 24 hours ending 07/07/25 0828    Lines/Drains/Airways       Peripheral Intravenous Line  Duration             Peripheral IV 07/04/25 2101 20 G Posterior;Right Hand 2 days    Peripheral IV Single Lumen 07/05/25 0200 Anterior;Right Upper Arm 2 days                  Significant Labs:   Chemistries:   Recent Labs   Lab 06/30/25  1339 07/04/25 2059 07/04/25 2059 07/05/25 0417 07/05/25  0927 07/05/25  1505 07/05/25 2103 07/06/25 0320 07/07/25 0411    140  --  137  --   --   --  138 138   K 4.0 3.7  --  3.6  --   --   --  3.3* 3.8    104  --  100  --   --   --  102 105   CO2 24 26  --  25  --   --   --  27 25   BUN 6.3* 8.2  --  7.6  --   --   --  10.3 10.1   CREATININE 0.65 0.70  --  0.58  --   --   --  0.59 0.61   CALCIUM 8.2* 7.9*  --  7.8*  --   --   --  7.4* 8.1*   PROT 5.8* 5.6*  --  6.7  --   --   --  5.3* 5.4*   BILITOT 0.2 0.5  --  0.4  --   --   --  0.4 0.4   ALKPHOS 133 116  --  128  --   --   --  93 95   ALT 7 15  --  15  --   --   --  11 22   AST 16 16  --  16  --   --   --  10* 17   MG  --   --    < > 4.90*  --  5.00*  --  3.00* 1.90   PHOS  --   --   --  3.8  --   --   --  3.9 3.8   TROPONINI  --   --   --  0.140* 0.116* 0.112* 0.098* 0.084*  --     < > = values in this interval not displayed.        CBC/Anemia Labs: Coags:    Recent Labs   Lab 07/05/25 0417 07/06/25 0320 07/07/25 0411   WBC 19.29* 12.52* 15.94*   HGB 10.2* 8.9* 8.4*   HCT 34.9* 30.1* 27.9*    327 289   MCV 77.0* 75.6* 76.0*   RDW 16.9 16.9 17.1*    No results for input(s): "PT", "INR", "APTT" in the last 168 hours.     Significant Imaging:  Imaging Results              X-Ray Chest PA And Lateral (Final result)  Result time 07/05/25 12:37:27      Final result by Becky Lentz MD (07/05/25 12:37:27)                   Impression:      Bilateral interstitial airspace opacities      Electronically signed by: Becky Lentz  Date:    07/05/2025  Time:    12:37               Narrative:    " EXAMINATION:  XR CHEST PA AND LATERAL    CLINICAL HISTORY:  shortness of breath;    TECHNIQUE:  PA and lateral chest radiographs    COMPARISON:  None.    FINDINGS:  The heart is normal in size.  There are bilateral interstitial airspace opacities.  There is no pleural effusion or visible pneumothorax.                        Preliminary result by Yariel Woods MD (07/04/25 22:17:21)                   Impression:    1. There is dense hazy opacity in the perihilar regions bilaterally which extends to the lung bases bilaterally obscuring portions of both heart borders. There is some patchy hazy opacity in the right upper lobe with the left upper lobe appearing clear. These findings are suggestive of multi lobar pneumonia. Correlate with clinical and laboratory findings and recommend continued interval follow-up to full resolution as indicated.  2. Details and other findings as noted above.               Narrative:    START OF REPORT:  Technique: PA and lateral views of the chest were performed.    Comparison: None.    Clinical History: Sob, recent pregnancy.    Findings:  Soft Tissues: Unremarkable.  Lines and Tubes: None.  Mediastinum: The cardiomediastinal silhouette is within normal limits.  Lungs: There is dense hazy opacity in the perihilar regions bilaterally which extends to the lung bases bilaterally obscuring portions of both heart borders. There is some patchy hazy opacity in the right upper lobe with the left upper lobe appearing clear.  Pleura: No pneumothorax or effusions are identified.  Bony Structures: The visualized bony structures appear unremarkable.                                      Telemetry:  ST    Physical Exam  Vitals and nursing note reviewed.   Constitutional:       General: She is not in acute distress.  HENT:      Head: Normocephalic.      Mouth/Throat:      Mouth: Mucous membranes are moist.   Cardiovascular:      Rate and Rhythm: Regular rhythm. Tachycardia present.   Pulmonary:       Effort: Pulmonary effort is normal. No respiratory distress.   Skin:     General: Skin is warm and dry.   Neurological:      Mental Status: She is alert. Mental status is at baseline.   Psychiatric:         Behavior: Behavior normal.       Home Medications:   Medications Ordered Prior to Encounter[1]  Current Schedule Inpatient Medications:   carvediloL  6.25 mg Oral BID    enoxparin  40 mg Subcutaneous Q24H (prophylaxis, 1700)         Assessment:   Acute Systolic Heart Failure (Compensated on RA)    - Bilateral Pleural Effusions with Scattered Ground Glass Opacities Bilateral Lung Fields (CT Imaging)    - Lactate Normal     - Sinus Tachycardia Related to Cardiomyopathy  NSTEMI Type II due to Hypoxemic Respiratory Failure due to decompensated HF & Hyacinth Partum Pre Eclampsia   Pre Eclampsia (BP Stable)  Hyacinth-partum Cardiomyopathy (New Onset)    - EF 25% (Echo 7.5.25)  Acute Hypoxemic Respiratory Failure (Resolved)    - Requiring Supplemental Oxygen Support  Pericardial Effusion (Small) with no evidence of Cardiac Tamponade (Echo 7.5.25)  Recent Pregnancy with C Section Delivery on 6.30.25  History of Hyperthyroidism    - TSH Normal  Anemia    Plan:   Advance Coreg to 12.5 Mg PO BID   Replete K+ this AM   Resume Lisinopril 2.5 Mg Daily  Continue Lasix 20 Mg PO Daily  Patient status Post Tubal Ligation. Patient educated on the importance of avoiding breast feeding given cardiomyopathy.  Will need close outpatient cardiology following.  Check BMP/Mag in 5 Days.  Stable for Discharge from [facility] Standpoint.  Will be available if needed.     Kait Pitt, KAROL  Cardiology  [facility]   Physician addendum:  I have seen and examined this patient as a split-shared visit with the BON d/t complicated medical management of above problems written in assessment and high acuity requiring physician expertise in medical decision-making. I performed the substantive portion of the history and exam. Above  medical decision-making is also formulated by me.    Cardiovascular exam:  S1, S2  Lungs:  fine crackles at bases.  Extremities:  + trace edema bilaterally    Plan:  Increase Coreg to 12.5 mg twice a day.  Resume lisinopril.  Recommend avoiding breast-feeding at this time.  If Her heart function is not getting better in next few weeks we will recommend bromocriptine to be started as outpatient    Continue supportive therapy.     Armen Jackson MD  Cardiologist         [1]   No current facility-administered medications on file prior to encounter.     Current Outpatient Medications on File Prior to Encounter   Medication Sig Dispense Refill    ibuprofen (ADVIL,MOTRIN) 600 MG tablet Take 600 mg by mouth 3 (three) times daily.      oxyCODONE-acetaminophen (PERCOCET) 5-325 mg per tablet Take 1 tablet by mouth every 4 (four) hours as needed for Pain.

## 2025-07-08 ENCOUNTER — PATIENT OUTREACH (OUTPATIENT)
Dept: ADMINISTRATIVE | Facility: CLINIC | Age: 35
End: 2025-07-08
Payer: COMMERCIAL

## 2025-07-08 LAB — NT-PROBNP SERPL IA-MCNC: 5104 PG/ML

## 2025-07-08 NOTE — PLAN OF CARE
Discharge orders noted. Additional clinical references attached.    Patient's discharge instructions given by bedside RN and reviewed via this VN with patient.    Education provided on new medication, diagnosis, and follow-up appointments.  Reinforced recommendation to not  breast feed per Cardiology.    All questions answered. Teach back method used. Patient verbalized understanding.     Floor nurse notified.      07/07/25 1915   AVS Confirmation   Discharge instructions and AVS provided to and reviewed with patient and/or significant other. Yes

## 2025-07-10 LAB
BACTERIA BLD CULT: NORMAL
BACTERIA BLD CULT: NORMAL

## 2025-07-11 NOTE — DISCHARGE SUMMARY
[facility] Telemetry  Discharge Note  Short Stay    * No surgery found *      OUTCOME: Patient tolerated treatment/procedure well without complication and is now ready for discharge.    DISPOSITION: Home or Self Care    FINAL DIAGNOSIS:  Pregnancy    FOLLOWUP: In clinic    DISCHARGE INSTRUCTIONS:  No discharge procedures on file.      Clinical Reference Documents Added to Patient Instructions         Document    CARDIOMYOPATHY DISCHARGE INSTRUCTIONS (ENGLISH)    LOW-SODIUM DIET (ENGLISH)    PREECLAMPSIA DISCHARGE INSTRUCTIONS (ENGLISH)    SINUS TACHYCARDIA DISCHARGE INSTRUCTIONS (ENGLISH)            TIME SPENT ON DISCHARGE: 20 minutes

## 2025-07-26 ENCOUNTER — LAB VISIT (OUTPATIENT)
Dept: LAB | Facility: HOSPITAL | Age: 35
End: 2025-07-26
Attending: NURSE PRACTITIONER
Payer: COMMERCIAL

## 2025-07-26 DIAGNOSIS — Z87.09 PERSONAL HISTORY OF UNSPECIFIED DISEASE OF RESPIRATORY SYSTEM: ICD-10-CM

## 2025-07-26 DIAGNOSIS — I42.9 PRIMARY CARDIOMYOPATHY: Primary | ICD-10-CM

## 2025-07-26 DIAGNOSIS — I21.4 ACUTE MYOCARDIAL INFARCTION, SUBENDOCARDIAL INFARCTION, INITIAL EPISODE OF CARE: ICD-10-CM

## 2025-07-26 LAB
ALBUMIN SERPL-MCNC: 3.6 G/DL (ref 3.5–5)
ALBUMIN/GLOB SERPL: 0.9 RATIO (ref 1.1–2)
ALP SERPL-CCNC: 104 UNIT/L (ref 40–150)
ALT SERPL-CCNC: 13 UNIT/L (ref 0–55)
ANION GAP SERPL CALC-SCNC: 9 MEQ/L
AST SERPL-CCNC: 16 UNIT/L (ref 11–45)
BILIRUB SERPL-MCNC: 0.6 MG/DL
BUN SERPL-MCNC: 14.8 MG/DL (ref 7–18.7)
CALCIUM SERPL-MCNC: 9.7 MG/DL (ref 8.4–10.2)
CHLORIDE SERPL-SCNC: 103 MMOL/L (ref 98–107)
CHOLEST SERPL-MCNC: 280 MG/DL
CHOLEST/HDLC SERPL: 5 {RATIO} (ref 0–5)
CO2 SERPL-SCNC: 28 MMOL/L (ref 22–29)
CREAT SERPL-MCNC: 0.87 MG/DL (ref 0.55–1.02)
CREAT/UREA NIT SERPL: 17
GFR SERPLBLD CREATININE-BSD FMLA CKD-EPI: >60 ML/MIN/1.73/M2
GLOBULIN SER-MCNC: 3.8 GM/DL (ref 2.4–3.5)
GLUCOSE SERPL-MCNC: 91 MG/DL (ref 74–100)
HDLC SERPL-MCNC: 56 MG/DL (ref 35–60)
LDLC SERPL CALC-MCNC: 200 MG/DL (ref 50–140)
POTASSIUM SERPL-SCNC: 5.1 MMOL/L (ref 3.5–5.1)
PROT SERPL-MCNC: 7.4 GM/DL (ref 6.4–8.3)
SODIUM SERPL-SCNC: 140 MMOL/L (ref 136–145)
TRIGL SERPL-MCNC: 119 MG/DL (ref 37–140)
VLDLC SERPL CALC-MCNC: 24 MG/DL

## 2025-07-26 PROCEDURE — 36415 COLL VENOUS BLD VENIPUNCTURE: CPT

## 2025-07-26 PROCEDURE — 80061 LIPID PANEL: CPT

## 2025-07-26 PROCEDURE — 80053 COMPREHEN METABOLIC PANEL: CPT

## (undated) DEVICE — GLOVE HYDROGEL SIZE 5.5

## (undated) DEVICE — ELECTRODE REM PLYHSV RETURN 9

## (undated) DEVICE — GLOVE PROTEXIS LTX MICRO 6

## (undated) DEVICE — DRESSING TELFA N ADH 4X10

## (undated) DEVICE — GLOVE PROTEXIS PI SYN SURG 7

## (undated) DEVICE — GAUZE SPONGE 4'X4' 8PLY NS

## (undated) DEVICE — BULB SYRINGE EAR IRRIGATION

## (undated) DEVICE — PAD SANITARY OB STERILE

## (undated) DEVICE — GLOVE PROTEXIS BLUE LATEX 6.5

## (undated) DEVICE — PACK C-SECTION CUSTOM

## (undated) DEVICE — SEE MEDLINE ITEM 157117

## (undated) DEVICE — SUT 1 27IN CHROMIC GUT CT-1

## (undated) DEVICE — MATTRESS HOVERMAT SPLIT TRNSF

## (undated) DEVICE — CAP BABY BEANIE

## (undated) DEVICE — SUT CHROMIC GUT 2-0 CT-1 27IN

## (undated) DEVICE — BINDER ABDOM 4PANEL 12IN LG/XL

## (undated) DEVICE — SUT 2/0 27IN PLAIN GUT CT

## (undated) DEVICE — Device

## (undated) DEVICE — PAD UNDERPAD 30X30

## (undated) DEVICE — GLOVE PROTEXIS HYDROGEL SZ7.5

## (undated) DEVICE — DRESSING TEGADERM CHG 4-3/4IN

## (undated) DEVICE — SEE MEDLINE ITEM 156931

## (undated) DEVICE — SOL WATER STRL IRR 1000ML

## (undated) DEVICE — SUT 1 36IN CHROMIC GUT CTX

## (undated) DEVICE — SOL NACL IRR 1000ML BTL

## (undated) DEVICE — GLOVE PROTEXIS BLUE LATEX 7

## (undated) DEVICE — SET FLUID WARMER RANGER

## (undated) DEVICE — SUT PLAIN MONO 2-0 XLH 27IN

## (undated) DEVICE — STAPLER SKIN PROXIMATE REG